# Patient Record
Sex: FEMALE | Race: WHITE | NOT HISPANIC OR LATINO | URBAN - METROPOLITAN AREA
[De-identification: names, ages, dates, MRNs, and addresses within clinical notes are randomized per-mention and may not be internally consistent; named-entity substitution may affect disease eponyms.]

---

## 2017-01-30 ENCOUNTER — OFFICE VISIT - HEALTHEAST (OUTPATIENT)
Dept: INFECTIOUS DISEASES | Facility: CLINIC | Age: 62
End: 2017-01-30

## 2017-01-30 DIAGNOSIS — M00.9 SEPTIC ARTHRITIS (H): ICD-10-CM

## 2017-01-30 DIAGNOSIS — T84.50XD PROSTHETIC JOINT INFECTION, SUBSEQUENT ENCOUNTER: ICD-10-CM

## 2017-01-30 DIAGNOSIS — B37.9 CANDIDA INFECTION: ICD-10-CM

## 2017-03-20 ENCOUNTER — RECORDS - HEALTHEAST (OUTPATIENT)
Dept: ADMINISTRATIVE | Facility: OTHER | Age: 62
End: 2017-03-20

## 2017-05-01 ENCOUNTER — OFFICE VISIT - HEALTHEAST (OUTPATIENT)
Dept: INFECTIOUS DISEASES | Facility: CLINIC | Age: 62
End: 2017-05-01

## 2017-05-01 DIAGNOSIS — B37.9 CANDIDA INFECTION: ICD-10-CM

## 2017-05-01 DIAGNOSIS — T84.50XD PROSTHETIC JOINT INFECTION, SUBSEQUENT ENCOUNTER: ICD-10-CM

## 2017-07-12 ASSESSMENT — MIFFLIN-ST. JEOR: SCORE: 1571.93

## 2017-07-16 ENCOUNTER — ANESTHESIA - HEALTHEAST (OUTPATIENT)
Dept: SURGERY | Facility: CLINIC | Age: 62
End: 2017-07-16

## 2017-07-16 ENCOUNTER — SURGERY - HEALTHEAST (OUTPATIENT)
Dept: SURGERY | Facility: CLINIC | Age: 62
End: 2017-07-16

## 2017-07-16 ASSESSMENT — MIFFLIN-ST. JEOR: SCORE: 1728.88

## 2017-07-18 ENCOUNTER — HOME CARE/HOSPICE - HEALTHEAST (OUTPATIENT)
Dept: HOME HEALTH SERVICES | Facility: HOME HEALTH | Age: 62
End: 2017-07-18

## 2017-07-19 ASSESSMENT — MIFFLIN-ST. JEOR: SCORE: 1714.82

## 2017-07-21 ENCOUNTER — AMBULATORY - HEALTHEAST (OUTPATIENT)
Dept: HOME HEALTH SERVICES | Facility: CLINIC | Age: 62
End: 2017-07-21

## 2017-07-21 ENCOUNTER — HOME CARE/HOSPICE - HEALTHEAST (OUTPATIENT)
Dept: HOME HEALTH SERVICES | Facility: HOME HEALTH | Age: 62
End: 2017-07-21

## 2017-07-21 DIAGNOSIS — Z96.659 INFECTED PROSTHETIC KNEE JOINT (H): ICD-10-CM

## 2017-07-21 DIAGNOSIS — T84.59XA INFECTED PROSTHETIC KNEE JOINT (H): ICD-10-CM

## 2017-07-21 ASSESSMENT — MIFFLIN-ST. JEOR: SCORE: 1737.5

## 2017-07-22 ENCOUNTER — HOME CARE/HOSPICE - HEALTHEAST (OUTPATIENT)
Dept: HOME HEALTH SERVICES | Facility: HOME HEALTH | Age: 62
End: 2017-07-22

## 2017-07-24 ENCOUNTER — COMMUNICATION - HEALTHEAST (OUTPATIENT)
Dept: HOME HEALTH SERVICES | Facility: CLINIC | Age: 62
End: 2017-07-24

## 2017-07-24 ENCOUNTER — HOME CARE/HOSPICE - HEALTHEAST (OUTPATIENT)
Dept: HOME HEALTH SERVICES | Facility: HOME HEALTH | Age: 62
End: 2017-07-24

## 2017-07-25 ENCOUNTER — COMMUNICATION - HEALTHEAST (OUTPATIENT)
Dept: INFECTIOUS DISEASES | Facility: CLINIC | Age: 62
End: 2017-07-25

## 2017-07-25 ENCOUNTER — RECORDS - HEALTHEAST (OUTPATIENT)
Dept: LAB | Facility: HOSPITAL | Age: 62
End: 2017-07-25

## 2017-07-25 ENCOUNTER — COMMUNICATION - HEALTHEAST (OUTPATIENT)
Dept: HOME HEALTH SERVICES | Facility: HOME HEALTH | Age: 62
End: 2017-07-25

## 2017-07-25 ENCOUNTER — HOME CARE/HOSPICE - HEALTHEAST (OUTPATIENT)
Dept: HOME HEALTH SERVICES | Facility: HOME HEALTH | Age: 62
End: 2017-07-25

## 2017-07-25 LAB
AST SERPL W P-5'-P-CCNC: 19 U/L (ref 0–40)
CREAT SERPL-MCNC: 0.69 MG/DL (ref 0.6–1.1)
GFR SERPL CREATININE-BSD FRML MDRD: >60 ML/MIN/1.73M2

## 2017-07-27 ENCOUNTER — COMMUNICATION - HEALTHEAST (OUTPATIENT)
Dept: HOME HEALTH SERVICES | Facility: HOME HEALTH | Age: 62
End: 2017-07-27

## 2017-07-27 ENCOUNTER — HOME CARE/HOSPICE - HEALTHEAST (OUTPATIENT)
Dept: HOME HEALTH SERVICES | Facility: HOME HEALTH | Age: 62
End: 2017-07-27

## 2017-07-31 ENCOUNTER — HOME CARE/HOSPICE - HEALTHEAST (OUTPATIENT)
Dept: HOME HEALTH SERVICES | Facility: HOME HEALTH | Age: 62
End: 2017-07-31

## 2017-08-02 ENCOUNTER — HOME CARE/HOSPICE - HEALTHEAST (OUTPATIENT)
Dept: HOME HEALTH SERVICES | Facility: HOME HEALTH | Age: 62
End: 2017-08-02

## 2017-08-02 ENCOUNTER — COMMUNICATION - HEALTHEAST (OUTPATIENT)
Dept: ADMINISTRATIVE | Facility: OTHER | Age: 62
End: 2017-08-02

## 2017-08-02 ENCOUNTER — RECORDS - HEALTHEAST (OUTPATIENT)
Dept: LAB | Facility: HOSPITAL | Age: 62
End: 2017-08-02

## 2017-08-02 LAB
AST SERPL W P-5'-P-CCNC: 13 U/L (ref 0–40)
CREAT SERPL-MCNC: 0.8 MG/DL (ref 0.6–1.1)
GFR SERPL CREATININE-BSD FRML MDRD: >60 ML/MIN/1.73M2

## 2017-08-03 ENCOUNTER — COMMUNICATION - HEALTHEAST (OUTPATIENT)
Dept: HOME HEALTH SERVICES | Facility: HOME HEALTH | Age: 62
End: 2017-08-03

## 2017-08-03 ASSESSMENT — MIFFLIN-ST. JEOR
SCORE: 1617.29
SCORE: 1754.38

## 2017-08-05 ASSESSMENT — MIFFLIN-ST. JEOR: SCORE: 1752.38

## 2017-08-06 ASSESSMENT — MIFFLIN-ST. JEOR: SCORE: 1757.38

## 2017-08-07 ENCOUNTER — HOME CARE/HOSPICE - HEALTHEAST (OUTPATIENT)
Dept: HOME HEALTH SERVICES | Facility: HOME HEALTH | Age: 62
End: 2017-08-07

## 2017-08-08 ENCOUNTER — ANESTHESIA - HEALTHEAST (OUTPATIENT)
Dept: SURGERY | Facility: CLINIC | Age: 62
End: 2017-08-08

## 2017-08-09 ENCOUNTER — SURGERY - HEALTHEAST (OUTPATIENT)
Dept: SURGERY | Facility: CLINIC | Age: 62
End: 2017-08-09

## 2017-08-14 ENCOUNTER — AMBULATORY - HEALTHEAST (OUTPATIENT)
Dept: HOME HEALTH SERVICES | Facility: CLINIC | Age: 62
End: 2017-08-14

## 2017-08-14 ENCOUNTER — HOME CARE/HOSPICE - HEALTHEAST (OUTPATIENT)
Dept: HOME HEALTH SERVICES | Facility: HOME HEALTH | Age: 62
End: 2017-08-14

## 2017-08-14 ENCOUNTER — COMMUNICATION - HEALTHEAST (OUTPATIENT)
Dept: HOME HEALTH SERVICES | Facility: CLINIC | Age: 62
End: 2017-08-14

## 2017-08-14 DIAGNOSIS — Z96.649 INFECTION OF PROSTHETIC HIP JOINT (H): ICD-10-CM

## 2017-08-14 DIAGNOSIS — T84.59XA INFECTION OF PROSTHETIC HIP JOINT (H): ICD-10-CM

## 2017-08-21 ENCOUNTER — COMMUNICATION - HEALTHEAST (OUTPATIENT)
Dept: HOME HEALTH SERVICES | Facility: HOME HEALTH | Age: 62
End: 2017-08-21

## 2017-08-22 ENCOUNTER — HOME CARE/HOSPICE - HEALTHEAST (OUTPATIENT)
Dept: HOME HEALTH SERVICES | Facility: HOME HEALTH | Age: 62
End: 2017-08-22

## 2017-08-23 ENCOUNTER — COMMUNICATION - HEALTHEAST (OUTPATIENT)
Dept: SURGERY | Facility: CLINIC | Age: 62
End: 2017-08-23

## 2017-08-23 ENCOUNTER — HOME CARE/HOSPICE - HEALTHEAST (OUTPATIENT)
Dept: HOME HEALTH SERVICES | Facility: HOME HEALTH | Age: 62
End: 2017-08-23

## 2017-08-25 ENCOUNTER — COMMUNICATION - HEALTHEAST (OUTPATIENT)
Dept: HOME HEALTH SERVICES | Facility: HOME HEALTH | Age: 62
End: 2017-08-25

## 2017-08-28 ENCOUNTER — HOME CARE/HOSPICE - HEALTHEAST (OUTPATIENT)
Dept: HOME HEALTH SERVICES | Facility: HOME HEALTH | Age: 62
End: 2017-08-28

## 2017-08-28 ENCOUNTER — AMBULATORY - HEALTHEAST (OUTPATIENT)
Dept: HOME HEALTH SERVICES | Facility: HOME HEALTH | Age: 62
End: 2017-08-28

## 2017-11-06 ENCOUNTER — AMBULATORY - HEALTHEAST (OUTPATIENT)
Dept: LAB | Facility: CLINIC | Age: 62
End: 2017-11-06

## 2017-11-06 ENCOUNTER — COMMUNICATION - HEALTHEAST (OUTPATIENT)
Dept: LAB | Facility: CLINIC | Age: 62
End: 2017-11-06

## 2017-11-06 ENCOUNTER — OFFICE VISIT - HEALTHEAST (OUTPATIENT)
Dept: INFECTIOUS DISEASES | Facility: CLINIC | Age: 62
End: 2017-11-06

## 2017-11-06 DIAGNOSIS — T81.49XA WOUND, SURGICAL, INFECTED: ICD-10-CM

## 2017-11-07 ENCOUNTER — COMMUNICATION - HEALTHEAST (OUTPATIENT)
Dept: INFECTIOUS DISEASES | Facility: CLINIC | Age: 62
End: 2017-11-07

## 2017-11-07 ENCOUNTER — AMBULATORY - HEALTHEAST (OUTPATIENT)
Dept: INFECTIOUS DISEASES | Facility: CLINIC | Age: 62
End: 2017-11-07

## 2017-11-08 ENCOUNTER — COMMUNICATION - HEALTHEAST (OUTPATIENT)
Dept: INFECTIOUS DISEASES | Facility: CLINIC | Age: 62
End: 2017-11-08

## 2017-11-09 ENCOUNTER — AMBULATORY - HEALTHEAST (OUTPATIENT)
Dept: INFECTIOUS DISEASES | Facility: CLINIC | Age: 62
End: 2017-11-09

## 2017-11-09 ENCOUNTER — COMMUNICATION - HEALTHEAST (OUTPATIENT)
Dept: INFECTIOUS DISEASES | Facility: CLINIC | Age: 62
End: 2017-11-09

## 2017-11-13 ENCOUNTER — AMBULATORY - HEALTHEAST (OUTPATIENT)
Dept: INFECTIOUS DISEASES | Facility: CLINIC | Age: 62
End: 2017-11-13

## 2017-11-13 ENCOUNTER — COMMUNICATION - HEALTHEAST (OUTPATIENT)
Dept: INFECTIOUS DISEASES | Facility: CLINIC | Age: 62
End: 2017-11-13

## 2017-11-21 ENCOUNTER — HOME CARE/HOSPICE - HEALTHEAST (OUTPATIENT)
Dept: HOME HEALTH SERVICES | Facility: HOME HEALTH | Age: 62
End: 2017-11-21

## 2017-11-25 ENCOUNTER — HOME INFUSION (PRE-WILLOW HOME INFUSION) (OUTPATIENT)
Dept: PHARMACY | Facility: CLINIC | Age: 62
End: 2017-11-25

## 2017-11-27 ENCOUNTER — RECORDS - HEALTHEAST (OUTPATIENT)
Dept: ADMINISTRATIVE | Facility: OTHER | Age: 62
End: 2017-11-27

## 2017-11-27 NOTE — PROGRESS NOTES
This is a recent snapshot of the patient's [facility] medical record.  For current drug dose and complete information and questions, call [phone] or In Basket pool, fv home infusion ([id number])  CSN Number:  [id number]

## 2017-11-28 ENCOUNTER — HOME INFUSION (PRE-WILLOW HOME INFUSION) (OUTPATIENT)
Dept: PHARMACY | Facility: CLINIC | Age: 62
End: 2017-11-28

## 2017-11-28 ENCOUNTER — RECORDS - HEALTHEAST (OUTPATIENT)
Dept: ADMINISTRATIVE | Facility: OTHER | Age: 62
End: 2017-11-28

## 2017-11-28 LAB
ALP SERPL-CCNC: 317 U/L (ref 40–150)
ALT SERPL W P-5'-P-CCNC: 183 U/L (ref 0–50)
AST SERPL W P-5'-P-CCNC: 225 U/L (ref 0–45)
BASOPHILS # BLD AUTO: 0 10E9/L (ref 0–0.2)
BASOPHILS NFR BLD AUTO: 0.3 %
BILIRUB SERPL-MCNC: 0.5 MG/DL (ref 0.2–1.3)
CREAT SERPL-MCNC: 0.52 MG/DL (ref 0.52–1.04)
CRP SERPL-MCNC: 40.1 MG/L (ref 0–8)
DIFFERENTIAL METHOD BLD: ABNORMAL
EOSINOPHIL # BLD AUTO: 0.2 10E9/L (ref 0–0.7)
EOSINOPHIL NFR BLD AUTO: 2.7 %
ERYTHROCYTE [DISTWIDTH] IN BLOOD BY AUTOMATED COUNT: 23.4 % (ref 10–15)
ERYTHROCYTE [SEDIMENTATION RATE] IN BLOOD BY WESTERGREN METHOD: 98 MM/H (ref 0–30)
GFR SERPL CREATININE-BSD FRML MDRD: >90 ML/MIN/1.7M2
HCT VFR BLD AUTO: 31.5 % (ref 35–47)
HGB BLD-MCNC: 8.8 G/DL (ref 11.7–15.7)
IMM GRANULOCYTES # BLD: 0 10E9/L (ref 0–0.4)
IMM GRANULOCYTES NFR BLD: 0.5 %
LYMPHOCYTES # BLD AUTO: 1.7 10E9/L (ref 0.8–5.3)
LYMPHOCYTES NFR BLD AUTO: 23 %
MCH RBC QN AUTO: 22.6 PG (ref 26.5–33)
MCHC RBC AUTO-ENTMCNC: 27.9 G/DL (ref 31.5–36.5)
MCV RBC AUTO: 81 FL (ref 78–100)
MONOCYTES # BLD AUTO: 0.7 10E9/L (ref 0–1.3)
MONOCYTES NFR BLD AUTO: 9.8 %
NEUTROPHILS # BLD AUTO: 4.7 10E9/L (ref 1.6–8.3)
NEUTROPHILS NFR BLD AUTO: 63.7 %
NRBC # BLD AUTO: 0 10*3/UL
NRBC BLD AUTO-RTO: 0 /100
PLATELET # BLD AUTO: 571 10E9/L (ref 150–450)
RBC # BLD AUTO: 3.9 10E12/L (ref 3.8–5.2)
WBC # BLD AUTO: 7.4 10E9/L (ref 4–11)

## 2017-11-28 PROCEDURE — 82565 ASSAY OF CREATININE: CPT | Performed by: INTERNAL MEDICINE

## 2017-11-28 PROCEDURE — 85025 COMPLETE CBC W/AUTO DIFF WBC: CPT | Performed by: INTERNAL MEDICINE

## 2017-11-28 PROCEDURE — 84075 ASSAY ALKALINE PHOSPHATASE: CPT | Performed by: INTERNAL MEDICINE

## 2017-11-28 PROCEDURE — 84460 ALANINE AMINO (ALT) (SGPT): CPT | Performed by: INTERNAL MEDICINE

## 2017-11-28 PROCEDURE — 86140 C-REACTIVE PROTEIN: CPT | Performed by: INTERNAL MEDICINE

## 2017-11-28 PROCEDURE — 82247 BILIRUBIN TOTAL: CPT | Performed by: INTERNAL MEDICINE

## 2017-11-28 PROCEDURE — 85652 RBC SED RATE AUTOMATED: CPT | Performed by: INTERNAL MEDICINE

## 2017-11-28 PROCEDURE — 84450 TRANSFERASE (AST) (SGOT): CPT | Performed by: INTERNAL MEDICINE

## 2017-11-29 ENCOUNTER — HOME INFUSION (PRE-WILLOW HOME INFUSION) (OUTPATIENT)
Dept: PHARMACY | Facility: CLINIC | Age: 62
End: 2017-11-29

## 2017-11-29 NOTE — PROGRESS NOTES
This is a recent snapshot of the patient's [facility] medical record.  For current drug dose and complete information and questions, call [phone] or In Basket pool, fv home infusion ([id number])  CSN Number:  [id number]

## 2017-11-30 NOTE — PROGRESS NOTES
This is a recent snapshot of the patient's [facility] medical record.  For current drug dose and complete information and questions, call [phone] or In Basket pool, fv home infusion ([id number])  CSN Number:  [id number]

## 2017-12-01 ENCOUNTER — HOME INFUSION (PRE-WILLOW HOME INFUSION) (OUTPATIENT)
Dept: PHARMACY | Facility: CLINIC | Age: 62
End: 2017-12-01

## 2017-12-04 ENCOUNTER — RECORDS - HEALTHEAST (OUTPATIENT)
Dept: ADMINISTRATIVE | Facility: OTHER | Age: 62
End: 2017-12-04

## 2017-12-04 NOTE — PROGRESS NOTES
This is a recent snapshot of the patient's [facility] medical record.  For current drug dose and complete information and questions, call [phone] or In Basket pool, fv home infusion ([id number])  CSN Number:  [id number]

## 2017-12-05 ENCOUNTER — HOME INFUSION (PRE-WILLOW HOME INFUSION) (OUTPATIENT)
Dept: PHARMACY | Facility: CLINIC | Age: 62
End: 2017-12-05

## 2017-12-05 LAB
ALBUMIN SERPL-MCNC: 2.7 G/DL (ref 3.4–5)
ALP SERPL-CCNC: 189 U/L (ref 40–150)
ALT SERPL W P-5'-P-CCNC: 30 U/L (ref 0–50)
ANION GAP SERPL CALCULATED.3IONS-SCNC: 6 MMOL/L (ref 3–14)
ANISOCYTOSIS BLD QL SMEAR: ABNORMAL
AST SERPL W P-5'-P-CCNC: 15 U/L (ref 0–45)
BASOPHILS # BLD AUTO: 0 10E9/L (ref 0–0.2)
BASOPHILS NFR BLD AUTO: 0.4 %
BILIRUB SERPL-MCNC: 0.3 MG/DL (ref 0.2–1.3)
BUN SERPL-MCNC: 7 MG/DL (ref 7–30)
CALCIUM SERPL-MCNC: 8.9 MG/DL (ref 8.5–10.1)
CHLORIDE SERPL-SCNC: 106 MMOL/L (ref 94–109)
CO2 SERPL-SCNC: 28 MMOL/L (ref 20–32)
CREAT SERPL-MCNC: 0.49 MG/DL (ref 0.52–1.04)
CRP SERPL-MCNC: 41.1 MG/L (ref 0–8)
DIFFERENTIAL METHOD BLD: ABNORMAL
EOSINOPHIL # BLD AUTO: 0.2 10E9/L (ref 0–0.7)
EOSINOPHIL NFR BLD AUTO: 2.5 %
ERYTHROCYTE [DISTWIDTH] IN BLOOD BY AUTOMATED COUNT: 23.2 % (ref 10–15)
ERYTHROCYTE [SEDIMENTATION RATE] IN BLOOD BY WESTERGREN METHOD: 98 MM/H (ref 0–30)
GFR SERPL CREATININE-BSD FRML MDRD: >90 ML/MIN/1.7M2
GLUCOSE SERPL-MCNC: 113 MG/DL (ref 70–99)
HCT VFR BLD AUTO: 29 % (ref 35–47)
HGB BLD-MCNC: 8.1 G/DL (ref 11.7–15.7)
HYPOCHROMIA BLD QL: PRESENT
IMM GRANULOCYTES # BLD: 0.1 10E9/L (ref 0–0.4)
IMM GRANULOCYTES NFR BLD: 0.7 %
LYMPHOCYTES # BLD AUTO: 1.8 10E9/L (ref 0.8–5.3)
LYMPHOCYTES NFR BLD AUTO: 25.5 %
MACROCYTES BLD QL SMEAR: PRESENT
MCH RBC QN AUTO: 22.6 PG (ref 26.5–33)
MCHC RBC AUTO-ENTMCNC: 27.9 G/DL (ref 31.5–36.5)
MCV RBC AUTO: 81 FL (ref 78–100)
MICROCYTES BLD QL SMEAR: PRESENT
MONOCYTES # BLD AUTO: 0.9 10E9/L (ref 0–1.3)
MONOCYTES NFR BLD AUTO: 12.7 %
NEUTROPHILS # BLD AUTO: 4.1 10E9/L (ref 1.6–8.3)
NEUTROPHILS NFR BLD AUTO: 58.2 %
NRBC # BLD AUTO: 0 10*3/UL
NRBC BLD AUTO-RTO: 0 /100
PLATELET # BLD AUTO: 552 10E9/L (ref 150–450)
PLATELET # BLD EST: ABNORMAL 10*3/UL
POTASSIUM SERPL-SCNC: 4 MMOL/L (ref 3.4–5.3)
PROT SERPL-MCNC: 8.3 G/DL (ref 6.8–8.8)
RBC # BLD AUTO: 3.59 10E12/L (ref 3.8–5.2)
SODIUM SERPL-SCNC: 140 MMOL/L (ref 133–144)
STOMATOCYTES BLD QL SMEAR: SLIGHT
WBC # BLD AUTO: 7.1 10E9/L (ref 4–11)

## 2017-12-05 PROCEDURE — 85025 COMPLETE CBC W/AUTO DIFF WBC: CPT | Performed by: INTERNAL MEDICINE

## 2017-12-05 PROCEDURE — 85652 RBC SED RATE AUTOMATED: CPT | Performed by: INTERNAL MEDICINE

## 2017-12-05 PROCEDURE — 86140 C-REACTIVE PROTEIN: CPT | Performed by: INTERNAL MEDICINE

## 2017-12-05 PROCEDURE — 80053 COMPREHEN METABOLIC PANEL: CPT | Performed by: INTERNAL MEDICINE

## 2017-12-06 ENCOUNTER — HOME INFUSION (PRE-WILLOW HOME INFUSION) (OUTPATIENT)
Dept: PHARMACY | Facility: CLINIC | Age: 62
End: 2017-12-06

## 2017-12-06 NOTE — PROGRESS NOTES
This is a recent snapshot of the patient's [facility] medical record.  For current drug dose and complete information and questions, call [phone] or In Basket pool, fv home infusion ([id number])  CSN Number:  [id number]

## 2017-12-11 ENCOUNTER — RECORDS - HEALTHEAST (OUTPATIENT)
Dept: ADMINISTRATIVE | Facility: OTHER | Age: 62
End: 2017-12-11

## 2017-12-12 ENCOUNTER — HOSPITAL ENCOUNTER (OUTPATIENT)
Dept: INTERVENTIONAL RADIOLOGY/VASCULAR | Facility: CLINIC | Age: 62
Discharge: HOME | End: 2017-12-12
Attending: INTERNAL MEDICINE

## 2017-12-12 ENCOUNTER — HOME INFUSION (PRE-WILLOW HOME INFUSION) (OUTPATIENT)
Dept: PHARMACY | Facility: CLINIC | Age: 62
End: 2017-12-12

## 2017-12-12 LAB
ALP SERPL-CCNC: 208 U/L (ref 40–150)
ALT SERPL W P-5'-P-CCNC: 17 U/L (ref 0–50)
AST SERPL W P-5'-P-CCNC: 16 U/L (ref 0–45)
BASOPHILS # BLD AUTO: 0 10E9/L (ref 0–0.2)
BASOPHILS NFR BLD AUTO: 0.2 %
BILIRUB SERPL-MCNC: 0.3 MG/DL (ref 0.2–1.3)
CREAT SERPL-MCNC: 0.52 MG/DL (ref 0.52–1.04)
CRP SERPL-MCNC: 69.2 MG/L (ref 0–8)
DIFFERENTIAL METHOD BLD: ABNORMAL
EOSINOPHIL # BLD AUTO: 0.2 10E9/L (ref 0–0.7)
EOSINOPHIL NFR BLD AUTO: 2.5 %
ERYTHROCYTE [DISTWIDTH] IN BLOOD BY AUTOMATED COUNT: 21.2 % (ref 10–15)
ERYTHROCYTE [SEDIMENTATION RATE] IN BLOOD BY WESTERGREN METHOD: 104 MM/H (ref 0–30)
GFR SERPL CREATININE-BSD FRML MDRD: >90 ML/MIN/1.7M2
HCT VFR BLD AUTO: 31.8 % (ref 35–47)
HGB BLD-MCNC: 8.7 G/DL (ref 11.7–15.7)
IMM GRANULOCYTES # BLD: 0 10E9/L (ref 0–0.4)
IMM GRANULOCYTES NFR BLD: 0.3 %
LYMPHOCYTES # BLD AUTO: 1.7 10E9/L (ref 0.8–5.3)
LYMPHOCYTES NFR BLD AUTO: 17.6 %
MCH RBC QN AUTO: 22 PG (ref 26.5–33)
MCHC RBC AUTO-ENTMCNC: 27.4 G/DL (ref 31.5–36.5)
MCV RBC AUTO: 81 FL (ref 78–100)
MONOCYTES # BLD AUTO: 0.9 10E9/L (ref 0–1.3)
MONOCYTES NFR BLD AUTO: 9.3 %
NEUTROPHILS # BLD AUTO: 6.6 10E9/L (ref 1.6–8.3)
NEUTROPHILS NFR BLD AUTO: 70.1 %
NRBC # BLD AUTO: 0 10*3/UL
NRBC BLD AUTO-RTO: 0 /100
PLATELET # BLD AUTO: 536 10E9/L (ref 150–450)
RBC # BLD AUTO: 3.95 10E12/L (ref 3.8–5.2)
WBC # BLD AUTO: 9.4 10E9/L (ref 4–11)

## 2017-12-12 PROCEDURE — 84450 TRANSFERASE (AST) (SGOT): CPT | Performed by: INTERNAL MEDICINE

## 2017-12-12 PROCEDURE — 84075 ASSAY ALKALINE PHOSPHATASE: CPT | Performed by: INTERNAL MEDICINE

## 2017-12-12 PROCEDURE — 85025 COMPLETE CBC W/AUTO DIFF WBC: CPT | Performed by: INTERNAL MEDICINE

## 2017-12-12 PROCEDURE — 82247 BILIRUBIN TOTAL: CPT | Performed by: INTERNAL MEDICINE

## 2017-12-12 PROCEDURE — 85652 RBC SED RATE AUTOMATED: CPT | Performed by: INTERNAL MEDICINE

## 2017-12-12 PROCEDURE — 86140 C-REACTIVE PROTEIN: CPT | Performed by: INTERNAL MEDICINE

## 2017-12-12 PROCEDURE — 84460 ALANINE AMINO (ALT) (SGPT): CPT | Performed by: INTERNAL MEDICINE

## 2017-12-12 PROCEDURE — 82565 ASSAY OF CREATININE: CPT | Performed by: INTERNAL MEDICINE

## 2017-12-13 NOTE — PROGRESS NOTES
This is a recent snapshot of the patient's [facility] medical record.  For current drug dose and complete information and questions, call [phone] or In Basket pool, fv home infusion ([id number])  CSN Number:  [id number]

## 2017-12-15 ENCOUNTER — HOME INFUSION (PRE-WILLOW HOME INFUSION) (OUTPATIENT)
Dept: PHARMACY | Facility: CLINIC | Age: 62
End: 2017-12-15

## 2017-12-18 NOTE — PROGRESS NOTES
This is a recent snapshot of the patient's [facility] medical record.  For current drug dose and complete information and questions, call [phone] or In Basket pool, fv home infusion ([id number])  CSN Number:  [id number]

## 2017-12-19 ENCOUNTER — RECORDS - HEALTHEAST (OUTPATIENT)
Dept: ADMINISTRATIVE | Facility: OTHER | Age: 62
End: 2017-12-19

## 2017-12-19 ENCOUNTER — DOCUMENTATION ONLY (OUTPATIENT)
Dept: CARE COORDINATION | Facility: CLINIC | Age: 62
End: 2017-12-19

## 2017-12-19 ENCOUNTER — HOME INFUSION (PRE-WILLOW HOME INFUSION) (OUTPATIENT)
Dept: PHARMACY | Facility: CLINIC | Age: 62
End: 2017-12-19

## 2017-12-19 LAB
ALP SERPL-CCNC: 226 U/L (ref 40–150)
ALT SERPL W P-5'-P-CCNC: 32 U/L (ref 0–50)
AST SERPL W P-5'-P-CCNC: 34 U/L (ref 0–45)
BASOPHILS # BLD AUTO: 0 10E9/L (ref 0–0.2)
BASOPHILS NFR BLD AUTO: 0.3 %
BILIRUB SERPL-MCNC: 0.2 MG/DL (ref 0.2–1.3)
CREAT SERPL-MCNC: 0.6 MG/DL (ref 0.52–1.04)
CRP SERPL-MCNC: 21.3 MG/L (ref 0–8)
DIFFERENTIAL METHOD BLD: ABNORMAL
EOSINOPHIL # BLD AUTO: 0.2 10E9/L (ref 0–0.7)
EOSINOPHIL NFR BLD AUTO: 3 %
ERYTHROCYTE [DISTWIDTH] IN BLOOD BY AUTOMATED COUNT: 20.8 % (ref 10–15)
ERYTHROCYTE [SEDIMENTATION RATE] IN BLOOD BY WESTERGREN METHOD: 85 MM/H (ref 0–30)
GFR SERPL CREATININE-BSD FRML MDRD: >90 ML/MIN/1.7M2
HCT VFR BLD AUTO: 32.5 % (ref 35–47)
HGB BLD-MCNC: 9 G/DL (ref 11.7–15.7)
IMM GRANULOCYTES # BLD: 0 10E9/L (ref 0–0.4)
IMM GRANULOCYTES NFR BLD: 0.5 %
LYMPHOCYTES # BLD AUTO: 1.9 10E9/L (ref 0.8–5.3)
LYMPHOCYTES NFR BLD AUTO: 25.1 %
MCH RBC QN AUTO: 22.2 PG (ref 26.5–33)
MCHC RBC AUTO-ENTMCNC: 27.7 G/DL (ref 31.5–36.5)
MCV RBC AUTO: 80 FL (ref 78–100)
MONOCYTES # BLD AUTO: 0.7 10E9/L (ref 0–1.3)
MONOCYTES NFR BLD AUTO: 8.7 %
NEUTROPHILS # BLD AUTO: 4.7 10E9/L (ref 1.6–8.3)
NEUTROPHILS NFR BLD AUTO: 62.4 %
NRBC # BLD AUTO: 0 10*3/UL
NRBC BLD AUTO-RTO: 0 /100
PLATELET # BLD AUTO: 551 10E9/L (ref 150–450)
RBC # BLD AUTO: 4.05 10E12/L (ref 3.8–5.2)
WBC # BLD AUTO: 7.6 10E9/L (ref 4–11)

## 2017-12-19 PROCEDURE — 84460 ALANINE AMINO (ALT) (SGPT): CPT | Performed by: INTERNAL MEDICINE

## 2017-12-19 PROCEDURE — 86140 C-REACTIVE PROTEIN: CPT | Performed by: INTERNAL MEDICINE

## 2017-12-19 PROCEDURE — 85652 RBC SED RATE AUTOMATED: CPT | Performed by: INTERNAL MEDICINE

## 2017-12-19 PROCEDURE — 85025 COMPLETE CBC W/AUTO DIFF WBC: CPT | Performed by: INTERNAL MEDICINE

## 2017-12-19 PROCEDURE — 84450 TRANSFERASE (AST) (SGOT): CPT | Performed by: INTERNAL MEDICINE

## 2017-12-19 PROCEDURE — 82565 ASSAY OF CREATININE: CPT | Performed by: INTERNAL MEDICINE

## 2017-12-19 PROCEDURE — 82247 BILIRUBIN TOTAL: CPT | Performed by: INTERNAL MEDICINE

## 2017-12-19 PROCEDURE — 84075 ASSAY ALKALINE PHOSPHATASE: CPT | Performed by: INTERNAL MEDICINE

## 2017-12-19 NOTE — PROGRESS NOTES
Dear Dr. Cecelia Muhammad  Medicare Home Health regulations requires [facility] to notify the Physician when the plan for visits has been altered.  We have provided fewer visits than ordered.  We are notifying you of a Missed Visit.  Nicole Graciela Tomi; MRN [id number]  Missed Visit  Is SW  Dates of missed services  12/19/2017  Reason: Patient declined need for SW assess  Sincerely [facility]  Sheridan Hernandez  [phone]

## 2017-12-20 ENCOUNTER — HOME INFUSION (PRE-WILLOW HOME INFUSION) (OUTPATIENT)
Dept: PHARMACY | Facility: CLINIC | Age: 62
End: 2017-12-20

## 2017-12-20 ENCOUNTER — COMMUNICATION - HEALTHEAST (OUTPATIENT)
Dept: INFECTIOUS DISEASES | Facility: CLINIC | Age: 62
End: 2017-12-20

## 2017-12-20 ENCOUNTER — HOSPITAL ENCOUNTER (OUTPATIENT)
Dept: CT IMAGING | Facility: HOSPITAL | Age: 62
Discharge: HOME | End: 2017-12-20
Attending: INTERNAL MEDICINE

## 2017-12-20 ENCOUNTER — HOSPITAL ENCOUNTER (OUTPATIENT)
Dept: INTERVENTIONAL RADIOLOGY/VASCULAR | Facility: HOSPITAL | Age: 62
Discharge: HOME | End: 2017-12-20
Attending: INTERNAL MEDICINE

## 2017-12-20 DIAGNOSIS — Z96.641 STATUS POST TOTAL REPLACEMENT OF RIGHT HIP: ICD-10-CM

## 2017-12-20 DIAGNOSIS — L02.91 ABSCESS: ICD-10-CM

## 2017-12-20 DIAGNOSIS — Z96.649 INFECTION OF PROSTHETIC HIP JOINT, SUBSEQUENT ENCOUNTER: ICD-10-CM

## 2017-12-20 DIAGNOSIS — T84.59XD INFECTION OF PROSTHETIC HIP JOINT, SUBSEQUENT ENCOUNTER: ICD-10-CM

## 2017-12-20 NOTE — PROGRESS NOTES
This is a recent snapshot of the patient's [facility] medical record.  For current drug dose and complete information and questions, call [phone] or In [facility] pool, fv home infusion ([id number])  CSN Number:  [id number]

## 2017-12-26 ENCOUNTER — RECORDS - HEALTHEAST (OUTPATIENT)
Dept: ADMINISTRATIVE | Facility: OTHER | Age: 62
End: 2017-12-26

## 2017-12-26 ENCOUNTER — HOME INFUSION (PRE-WILLOW HOME INFUSION) (OUTPATIENT)
Dept: PHARMACY | Facility: CLINIC | Age: 62
End: 2017-12-26

## 2017-12-26 LAB
ALP SERPL-CCNC: 212 U/L (ref 40–150)
ALT SERPL W P-5'-P-CCNC: 24 U/L (ref 0–50)
AST SERPL W P-5'-P-CCNC: 21 U/L (ref 0–45)
BASOPHILS # BLD AUTO: 0 10E9/L (ref 0–0.2)
BASOPHILS NFR BLD AUTO: 0.5 %
BILIRUB SERPL-MCNC: 0.2 MG/DL (ref 0.2–1.3)
CREAT SERPL-MCNC: 0.65 MG/DL (ref 0.52–1.04)
CRP SERPL-MCNC: 13.4 MG/L (ref 0–8)
DIFFERENTIAL METHOD BLD: ABNORMAL
EOSINOPHIL # BLD AUTO: 0.2 10E9/L (ref 0–0.7)
EOSINOPHIL NFR BLD AUTO: 3.8 %
ERYTHROCYTE [DISTWIDTH] IN BLOOD BY AUTOMATED COUNT: 21.3 % (ref 10–15)
ERYTHROCYTE [SEDIMENTATION RATE] IN BLOOD BY WESTERGREN METHOD: 70 MM/H (ref 0–30)
GFR SERPL CREATININE-BSD FRML MDRD: >90 ML/MIN/1.7M2
HCT VFR BLD AUTO: 33.9 % (ref 35–47)
HGB BLD-MCNC: 9.4 G/DL (ref 11.7–15.7)
IMM GRANULOCYTES # BLD: 0 10E9/L (ref 0–0.4)
IMM GRANULOCYTES NFR BLD: 0.5 %
LYMPHOCYTES # BLD AUTO: 1.6 10E9/L (ref 0.8–5.3)
LYMPHOCYTES NFR BLD AUTO: 28.4 %
MCH RBC QN AUTO: 22.5 PG (ref 26.5–33)
MCHC RBC AUTO-ENTMCNC: 27.7 G/DL (ref 31.5–36.5)
MCV RBC AUTO: 81 FL (ref 78–100)
MONOCYTES # BLD AUTO: 0.5 10E9/L (ref 0–1.3)
MONOCYTES NFR BLD AUTO: 8.4 %
NEUTROPHILS # BLD AUTO: 3.4 10E9/L (ref 1.6–8.3)
NEUTROPHILS NFR BLD AUTO: 58.4 %
NRBC # BLD AUTO: 0 10*3/UL
NRBC BLD AUTO-RTO: 0 /100
PLATELET # BLD AUTO: 494 10E9/L (ref 150–450)
RBC # BLD AUTO: 4.17 10E12/L (ref 3.8–5.2)
WBC # BLD AUTO: 5.7 10E9/L (ref 4–11)

## 2017-12-26 PROCEDURE — 84450 TRANSFERASE (AST) (SGOT): CPT | Performed by: INTERNAL MEDICINE

## 2017-12-26 PROCEDURE — 86140 C-REACTIVE PROTEIN: CPT | Performed by: INTERNAL MEDICINE

## 2017-12-26 PROCEDURE — 82565 ASSAY OF CREATININE: CPT | Performed by: INTERNAL MEDICINE

## 2017-12-26 PROCEDURE — 82247 BILIRUBIN TOTAL: CPT | Performed by: INTERNAL MEDICINE

## 2017-12-26 PROCEDURE — 84075 ASSAY ALKALINE PHOSPHATASE: CPT | Performed by: INTERNAL MEDICINE

## 2017-12-26 PROCEDURE — 85652 RBC SED RATE AUTOMATED: CPT | Performed by: INTERNAL MEDICINE

## 2017-12-26 PROCEDURE — 84460 ALANINE AMINO (ALT) (SGPT): CPT | Performed by: INTERNAL MEDICINE

## 2017-12-26 PROCEDURE — 85025 COMPLETE CBC W/AUTO DIFF WBC: CPT | Performed by: INTERNAL MEDICINE

## 2017-12-27 ENCOUNTER — HOME INFUSION (PRE-WILLOW HOME INFUSION) (OUTPATIENT)
Dept: PHARMACY | Facility: CLINIC | Age: 62
End: 2017-12-27

## 2017-12-27 NOTE — PROGRESS NOTES
This is a recent snapshot of the patient's [facility] medical record.  For current drug dose and complete information and questions, call [phone] or In Basket pool, fv home infusion ([id number])  CSN Number:  [id number]

## 2017-12-28 NOTE — PROGRESS NOTES
This is a recent snapshot of the patient's [facility] medical record.  For current drug dose and complete information and questions, call [phone] or In Basket pool, fv home infusion ([id number])  CSN Number:  [id number]

## 2018-01-02 ENCOUNTER — HOME INFUSION (PRE-WILLOW HOME INFUSION) (OUTPATIENT)
Dept: PHARMACY | Facility: CLINIC | Age: 63
End: 2018-01-02

## 2018-01-02 LAB
ALP SERPL-CCNC: 197 U/L (ref 40–150)
ALT SERPL W P-5'-P-CCNC: 27 U/L (ref 0–50)
AST SERPL W P-5'-P-CCNC: 27 U/L (ref 0–45)
BASOPHILS # BLD AUTO: 0 10E9/L (ref 0–0.2)
BASOPHILS NFR BLD AUTO: 0.3 %
BILIRUB SERPL-MCNC: 0.4 MG/DL (ref 0.2–1.3)
CREAT SERPL-MCNC: 0.74 MG/DL (ref 0.52–1.04)
CRP SERPL-MCNC: 12.9 MG/L (ref 0–8)
DIFFERENTIAL METHOD BLD: ABNORMAL
EOSINOPHIL # BLD AUTO: 0.2 10E9/L (ref 0–0.7)
EOSINOPHIL NFR BLD AUTO: 3.1 %
ERYTHROCYTE [DISTWIDTH] IN BLOOD BY AUTOMATED COUNT: 21.9 % (ref 10–15)
ERYTHROCYTE [SEDIMENTATION RATE] IN BLOOD BY WESTERGREN METHOD: 67 MM/H (ref 0–30)
GFR SERPL CREATININE-BSD FRML MDRD: 79 ML/MIN/1.7M2
HCT VFR BLD AUTO: 35.8 % (ref 35–47)
HGB BLD-MCNC: 9.8 G/DL (ref 11.7–15.7)
IMM GRANULOCYTES # BLD: 0 10E9/L (ref 0–0.4)
IMM GRANULOCYTES NFR BLD: 0.3 %
LYMPHOCYTES # BLD AUTO: 2.1 10E9/L (ref 0.8–5.3)
LYMPHOCYTES NFR BLD AUTO: 33.5 %
MCH RBC QN AUTO: 22.8 PG (ref 26.5–33)
MCHC RBC AUTO-ENTMCNC: 27.4 G/DL (ref 31.5–36.5)
MCV RBC AUTO: 83 FL (ref 78–100)
MONOCYTES # BLD AUTO: 0.7 10E9/L (ref 0–1.3)
MONOCYTES NFR BLD AUTO: 10.6 %
NEUTROPHILS # BLD AUTO: 3.2 10E9/L (ref 1.6–8.3)
NEUTROPHILS NFR BLD AUTO: 52.2 %
NRBC # BLD AUTO: 0 10*3/UL
NRBC BLD AUTO-RTO: 0 /100
PLATELET # BLD AUTO: 419 10E9/L (ref 150–450)
RBC # BLD AUTO: 4.3 10E12/L (ref 3.8–5.2)
WBC # BLD AUTO: 6.1 10E9/L (ref 4–11)

## 2018-01-02 PROCEDURE — 82565 ASSAY OF CREATININE: CPT | Performed by: INTERNAL MEDICINE

## 2018-01-02 PROCEDURE — 85025 COMPLETE CBC W/AUTO DIFF WBC: CPT | Performed by: INTERNAL MEDICINE

## 2018-01-02 PROCEDURE — 86140 C-REACTIVE PROTEIN: CPT | Performed by: INTERNAL MEDICINE

## 2018-01-02 PROCEDURE — 84460 ALANINE AMINO (ALT) (SGPT): CPT | Performed by: INTERNAL MEDICINE

## 2018-01-02 PROCEDURE — 84075 ASSAY ALKALINE PHOSPHATASE: CPT | Performed by: INTERNAL MEDICINE

## 2018-01-02 PROCEDURE — 84450 TRANSFERASE (AST) (SGOT): CPT | Performed by: INTERNAL MEDICINE

## 2018-01-02 PROCEDURE — 85652 RBC SED RATE AUTOMATED: CPT | Performed by: INTERNAL MEDICINE

## 2018-01-02 PROCEDURE — 82247 BILIRUBIN TOTAL: CPT | Performed by: INTERNAL MEDICINE

## 2018-01-03 ENCOUNTER — HOSPITAL ENCOUNTER (OUTPATIENT)
Dept: INTERVENTIONAL RADIOLOGY/VASCULAR | Facility: HOSPITAL | Age: 63
Discharge: HOME | End: 2018-01-03
Attending: RADIOLOGY

## 2018-01-03 ENCOUNTER — HOSPITAL ENCOUNTER (OUTPATIENT)
Dept: CT IMAGING | Facility: HOSPITAL | Age: 63
Discharge: HOME | End: 2018-01-03

## 2018-01-03 ENCOUNTER — HOME INFUSION (PRE-WILLOW HOME INFUSION) (OUTPATIENT)
Dept: PHARMACY | Facility: CLINIC | Age: 63
End: 2018-01-03

## 2018-01-03 ENCOUNTER — RECORDS - HEALTHEAST (OUTPATIENT)
Dept: ADMINISTRATIVE | Facility: OTHER | Age: 63
End: 2018-01-03

## 2018-01-03 DIAGNOSIS — T81.40XA INFECTION FOLLOWING PROCEDURE: ICD-10-CM

## 2018-01-03 DIAGNOSIS — L02.91 ABSCESS: ICD-10-CM

## 2018-01-03 DIAGNOSIS — T81.49XA ABSCESS AFTER PROCEDURE: ICD-10-CM

## 2018-01-03 LAB
CREAT BLD-MCNC: 0.6 MG/DL
POC GFR AMER AF HE - HISTORICAL: >60 ML/MIN/1.73M2
POC GFR NON AMER AF HE - HISTORICAL: >60 ML/MIN/1.73M2

## 2018-01-03 NOTE — PROGRESS NOTES
This is a recent snapshot of the patient's [facility] medical record.  For current drug dose and complete information and questions, call [phone] or In Basket pool, fv home infusion ([id number])  CSN Number:  [id number]

## 2018-01-04 NOTE — PROGRESS NOTES
This is a recent snapshot of the patient's [facility] medical record.  For current drug dose and complete information and questions, call [phone] or In [facility] pool, fv home infusion ([id number])  CSN Number:  [id number]

## 2018-01-08 ENCOUNTER — OFFICE VISIT - HEALTHEAST (OUTPATIENT)
Dept: INFECTIOUS DISEASES | Facility: CLINIC | Age: 63
End: 2018-01-08

## 2018-01-08 DIAGNOSIS — M00.9 SEPTIC ARTHRITIS (H): ICD-10-CM

## 2018-01-09 ENCOUNTER — RECORDS - HEALTHEAST (OUTPATIENT)
Dept: LAB | Facility: CLINIC | Age: 63
End: 2018-01-09

## 2018-01-09 ENCOUNTER — HOME INFUSION (PRE-WILLOW HOME INFUSION) (OUTPATIENT)
Dept: PHARMACY | Facility: CLINIC | Age: 63
End: 2018-01-09

## 2018-01-09 LAB
ALP SERPL-CCNC: 181 U/L (ref 45–120)
ALT SERPL W P-5'-P-CCNC: 22 U/L (ref 0–45)
AST SERPL W P-5'-P-CCNC: 23 U/L (ref 0–40)
BASOPHILS # BLD AUTO: 0 THOU/UL (ref 0–0.2)
BASOPHILS NFR BLD AUTO: 0 % (ref 0–2)
BILIRUB SERPL-MCNC: 0.2 MG/DL (ref 0–1)
C REACTIVE PROTEIN LHE: 3.6 MG/DL (ref 0–0.8)
CREAT SERPL-MCNC: 0.66 MG/DL (ref 0.6–1.1)
EOSINOPHIL # BLD AUTO: 0.2 THOU/UL (ref 0–0.4)
EOSINOPHIL NFR BLD AUTO: 2 % (ref 0–6)
ERYTHROCYTE [DISTWIDTH] IN BLOOD BY AUTOMATED COUNT: 22.5 % (ref 11–14.5)
ERYTHROCYTE [SEDIMENTATION RATE] IN BLOOD BY WESTERGREN METHOD: 76 MM/HR (ref 0–20)
GFR SERPL CREATININE-BSD FRML MDRD: >60 ML/MIN/1.73M2
HCT VFR BLD AUTO: 36.5 % (ref 35–47)
HGB BLD-MCNC: 10.3 G/DL (ref 12–16)
LYMPHOCYTES # BLD AUTO: 1.7 THOU/UL (ref 0.8–4.4)
LYMPHOCYTES NFR BLD AUTO: 21 % (ref 20–40)
MCH RBC QN AUTO: 23.7 PG (ref 27–34)
MCHC RBC AUTO-ENTMCNC: 28.2 G/DL (ref 32–36)
MCV RBC AUTO: 84 FL (ref 80–100)
MONOCYTES # BLD AUTO: 0.7 THOU/UL (ref 0–0.9)
MONOCYTES NFR BLD AUTO: 9 % (ref 2–10)
NEUTROPHILS # BLD AUTO: 5.3 THOU/UL (ref 2–7.7)
NEUTROPHILS NFR BLD AUTO: 67 % (ref 50–70)
PLAT MORPH BLD: NORMAL
PLATELET # BLD AUTO: 382 THOU/UL (ref 140–440)
PMV BLD AUTO: 10.3 FL (ref 8.5–12.5)
POLYCHROMASIA BLD QL SMEAR: ABNORMAL
RBC # BLD AUTO: 4.34 MILL/UL (ref 3.8–5.4)
TARGETS BLD QL SMEAR: ABNORMAL
WBC: 7.9 THOU/UL (ref 4–11)

## 2018-01-10 NOTE — PROGRESS NOTES
This is a recent snapshot of the patient's [facility] medical record.  For current drug dose and complete information and questions, call [phone] or In Basket pool, fv home infusion ([phone])  CSN Number:  [id number]

## 2018-01-12 ENCOUNTER — RECORDS - HEALTHEAST (OUTPATIENT)
Dept: ADMINISTRATIVE | Facility: OTHER | Age: 63
End: 2018-01-12

## 2018-06-06 ASSESSMENT — MIFFLIN-ST. JEOR
SCORE: 2013.06
SCORE: 1617.29

## 2018-06-07 ASSESSMENT — MIFFLIN-ST. JEOR: SCORE: 1971.1

## 2018-06-08 ENCOUNTER — SURGERY - HEALTHEAST (OUTPATIENT)
Dept: CARDIOLOGY | Facility: CLINIC | Age: 63
End: 2018-06-08

## 2018-06-08 ASSESSMENT — MIFFLIN-ST. JEOR: SCORE: 1857.7

## 2018-06-09 ASSESSMENT — MIFFLIN-ST. JEOR: SCORE: 1849.99

## 2018-06-10 ASSESSMENT — MIFFLIN-ST. JEOR: SCORE: 1832.75

## 2018-06-11 ASSESSMENT — MIFFLIN-ST. JEOR: SCORE: 1748.84

## 2018-06-15 ENCOUNTER — AMBULATORY - HEALTHEAST (OUTPATIENT)
Dept: CARDIOLOGY | Facility: CLINIC | Age: 63
End: 2018-06-15

## 2018-06-15 ENCOUNTER — RECORDS - HEALTHEAST (OUTPATIENT)
Dept: ADMINISTRATIVE | Facility: OTHER | Age: 63
End: 2018-06-15

## 2018-06-25 ENCOUNTER — RECORDS - HEALTHEAST (OUTPATIENT)
Dept: LAB | Facility: CLINIC | Age: 63
End: 2018-06-25

## 2018-06-25 LAB
ALBUMIN SERPL-MCNC: 3.9 G/DL (ref 3.5–5)
ALP SERPL-CCNC: 171 U/L (ref 45–120)
ALT SERPL W P-5'-P-CCNC: 43 U/L (ref 0–45)
ANION GAP SERPL CALCULATED.3IONS-SCNC: 12 MMOL/L (ref 5–18)
AST SERPL W P-5'-P-CCNC: 44 U/L (ref 0–40)
BILIRUB SERPL-MCNC: 0.6 MG/DL (ref 0–1)
BUN SERPL-MCNC: 20 MG/DL (ref 8–22)
CALCIUM SERPL-MCNC: 10.3 MG/DL (ref 8.5–10.5)
CHLORIDE BLD-SCNC: 103 MMOL/L (ref 98–107)
CO2 SERPL-SCNC: 24 MMOL/L (ref 22–31)
CREAT SERPL-MCNC: 0.84 MG/DL (ref 0.6–1.1)
GFR SERPL CREATININE-BSD FRML MDRD: >60 ML/MIN/1.73M2
GLUCOSE BLD-MCNC: 124 MG/DL (ref 70–125)
IRON SERPL-MCNC: 45 UG/DL (ref 42–175)
POTASSIUM BLD-SCNC: 3.7 MMOL/L (ref 3.5–5)
PROT SERPL-MCNC: 8.8 G/DL (ref 6–8)
SODIUM SERPL-SCNC: 139 MMOL/L (ref 136–145)

## 2018-07-11 ENCOUNTER — AMBULATORY - HEALTHEAST (OUTPATIENT)
Dept: CARDIOLOGY | Facility: CLINIC | Age: 63
End: 2018-07-11

## 2018-08-20 ENCOUNTER — COMMUNICATION - HEALTHEAST (OUTPATIENT)
Dept: CARDIOLOGY | Facility: CLINIC | Age: 63
End: 2018-08-20

## 2018-09-11 ENCOUNTER — COMMUNICATION - HEALTHEAST (OUTPATIENT)
Dept: CARDIOLOGY | Facility: CLINIC | Age: 63
End: 2018-09-11

## 2019-01-18 ENCOUNTER — COMMUNICATION - HEALTHEAST (OUTPATIENT)
Dept: INFECTIOUS DISEASES | Facility: CLINIC | Age: 64
End: 2019-01-18

## 2019-01-18 DIAGNOSIS — M00.9 SEPTIC ARTHRITIS (H): ICD-10-CM

## 2019-02-13 NOTE — PROGRESS NOTES
This is a recent snapshot of the patient's [facility] medical record.  For current drug dose and complete information and questions, call [phone] or In Basket pool, fv home infusion ([id number])  CSN Number:  [id number]      
No significant past surgical history

## 2019-04-06 ENCOUNTER — HOME CARE/HOSPICE - HEALTHEAST (OUTPATIENT)
Dept: HOME HEALTH SERVICES | Facility: HOME HEALTH | Age: 64
End: 2019-04-06

## 2020-02-27 ENCOUNTER — AMBULATORY - HEALTHEAST (OUTPATIENT)
Dept: PULMONOLOGY | Facility: OTHER | Age: 65
End: 2020-02-27

## 2020-02-27 DIAGNOSIS — R06.02 SOB (SHORTNESS OF BREATH): ICD-10-CM

## 2020-04-20 ENCOUNTER — OFFICE VISIT - HEALTHEAST (OUTPATIENT)
Dept: PULMONOLOGY | Facility: OTHER | Age: 65
End: 2020-04-20

## 2020-08-15 ENCOUNTER — COMMUNICATION - HEALTHEAST (OUTPATIENT)
Dept: SCHEDULING | Facility: CLINIC | Age: 65
End: 2020-08-15

## 2020-08-21 ENCOUNTER — HOME CARE/HOSPICE - HEALTHEAST (OUTPATIENT)
Dept: HOME HEALTH SERVICES | Facility: HOME HEALTH | Age: 65
End: 2020-08-21

## 2020-08-26 ENCOUNTER — HOME CARE/HOSPICE - HEALTHEAST (OUTPATIENT)
Dept: HOME HEALTH SERVICES | Facility: HOME HEALTH | Age: 65
End: 2020-08-26

## 2020-08-28 ENCOUNTER — HOME CARE/HOSPICE - HEALTHEAST (OUTPATIENT)
Dept: HOME HEALTH SERVICES | Facility: HOME HEALTH | Age: 65
End: 2020-08-28

## 2020-09-01 ENCOUNTER — HOME CARE/HOSPICE - HEALTHEAST (OUTPATIENT)
Dept: HOME HEALTH SERVICES | Facility: HOME HEALTH | Age: 65
End: 2020-09-01

## 2020-09-02 ENCOUNTER — HOME CARE/HOSPICE - HEALTHEAST (OUTPATIENT)
Dept: ADMINISTRATIVE | Facility: OTHER | Age: 65
End: 2020-09-02

## 2020-09-02 ENCOUNTER — HOME CARE/HOSPICE - HEALTHEAST (OUTPATIENT)
Dept: HOME HEALTH SERVICES | Facility: HOME HEALTH | Age: 65
End: 2020-09-02

## 2020-09-04 ENCOUNTER — HOME CARE/HOSPICE - HEALTHEAST (OUTPATIENT)
Dept: ADMINISTRATIVE | Facility: OTHER | Age: 65
End: 2020-09-04

## 2020-09-05 ENCOUNTER — HOME CARE/HOSPICE - HEALTHEAST (OUTPATIENT)
Dept: HOME HEALTH SERVICES | Facility: HOME HEALTH | Age: 65
End: 2020-09-05

## 2020-09-08 ENCOUNTER — HOME CARE/HOSPICE - HEALTHEAST (OUTPATIENT)
Dept: HOME HEALTH SERVICES | Facility: HOME HEALTH | Age: 65
End: 2020-09-08

## 2020-09-09 ENCOUNTER — HOME CARE/HOSPICE - HEALTHEAST (OUTPATIENT)
Dept: HOME HEALTH SERVICES | Facility: HOME HEALTH | Age: 65
End: 2020-09-09

## 2020-09-10 ENCOUNTER — HOME CARE/HOSPICE - HEALTHEAST (OUTPATIENT)
Dept: HOME HEALTH SERVICES | Facility: HOME HEALTH | Age: 65
End: 2020-09-10

## 2020-09-11 ENCOUNTER — HOME CARE/HOSPICE - HEALTHEAST (OUTPATIENT)
Dept: HOME HEALTH SERVICES | Facility: HOME HEALTH | Age: 65
End: 2020-09-11

## 2020-09-13 ENCOUNTER — HOME CARE/HOSPICE - HEALTHEAST (OUTPATIENT)
Dept: HOME HEALTH SERVICES | Facility: HOME HEALTH | Age: 65
End: 2020-09-13

## 2020-09-17 ENCOUNTER — COMMUNICATION - HEALTHEAST (OUTPATIENT)
Dept: HOME HEALTH SERVICES | Facility: HOME HEALTH | Age: 65
End: 2020-09-17

## 2020-11-29 ENCOUNTER — ANESTHESIA - HEALTHEAST (OUTPATIENT)
Dept: INTENSIVE CARE | Facility: CLINIC | Age: 65
End: 2020-11-29

## 2020-11-29 ENCOUNTER — RECORDS - HEALTHEAST (OUTPATIENT)
Dept: INTENSIVE CARE | Facility: CLINIC | Age: 65
End: 2020-11-29

## 2020-11-30 ENCOUNTER — COMMUNICATION - HEALTHEAST (OUTPATIENT)
Dept: SCHEDULING | Facility: CLINIC | Age: 65
End: 2020-11-30

## 2020-12-06 ENCOUNTER — HOME CARE/HOSPICE - HEALTHEAST (OUTPATIENT)
Dept: HOME HEALTH SERVICES | Facility: HOME HEALTH | Age: 65
End: 2020-12-06

## 2021-05-09 ENCOUNTER — HEALTH MAINTENANCE LETTER (OUTPATIENT)
Age: 66
End: 2021-05-09

## 2021-05-26 VITALS
DIASTOLIC BLOOD PRESSURE: 86 MMHG | OXYGEN SATURATION: 96 % | RESPIRATION RATE: 18 BRPM | TEMPERATURE: 98.2 F | SYSTOLIC BLOOD PRESSURE: 128 MMHG

## 2021-05-27 VITALS
SYSTOLIC BLOOD PRESSURE: 128 MMHG | DIASTOLIC BLOOD PRESSURE: 86 MMHG | HEART RATE: 88 BPM | TEMPERATURE: 98.1 F | OXYGEN SATURATION: 96 %

## 2021-05-30 ENCOUNTER — RECORDS - HEALTHEAST (OUTPATIENT)
Dept: ADMINISTRATIVE | Facility: CLINIC | Age: 66
End: 2021-05-30

## 2021-05-31 VITALS — BODY MASS INDEX: 29.53 KG/M2 | WEIGHT: 200 LBS

## 2021-05-31 VITALS — HEIGHT: 69 IN | BODY MASS INDEX: 37.16 KG/M2 | WEIGHT: 250.88 LBS

## 2021-05-31 VITALS — BODY MASS INDEX: 36.51 KG/M2 | WEIGHT: 246.5 LBS | HEIGHT: 69 IN

## 2021-06-01 VITALS — BODY MASS INDEX: 36.88 KG/M2 | HEIGHT: 69 IN | WEIGHT: 249 LBS

## 2021-06-02 ENCOUNTER — RECORDS - HEALTHEAST (OUTPATIENT)
Dept: ADMINISTRATIVE | Facility: CLINIC | Age: 66
End: 2021-06-02

## 2021-06-04 NOTE — PROGRESS NOTES
This is a recent snapshot of the patient's [facility] medical record.  For current drug dose and complete information and questions, call [phone] or In Basket pool, fv home infusion ([id number])  CSN Number:  [id number]     risk factors
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
## 2021-06-11 NOTE — ANESTHESIA PROCEDURE NOTES
Spinal Block    Start time: 7/16/2017 11:25 AM  End time: 7/16/2017 11:31 AM  Reason for block: primary anesthetic    Staffing:  Performing  Anesthesiologist: MIS AMES    Preanesthetic Checklist  Completed: patient identified, risks, benefits, and alternatives discussed, timeout performed, consent obtained, airway assessed, oxygen available, suction available, emergency drugs available and hand hygiene performed  Spinal Block  Patient position: sitting  Prep: Betadine  Patient monitoring: heart rate, cardiac monitor, continuous pulse ox and blood pressure  Approach: right paramedian  Location: L4-5  Injection technique: single-shot  Needle type: pencil-tip   Needle gauge: 24 G    Assessment  Sensory level: T8

## 2021-06-11 NOTE — ANESTHESIA CARE TRANSFER NOTE
Last vitals:   Vitals:    07/16/17 1335   BP: 123/56   Pulse: 72   Resp: 16   Temp: 36.1  C (97  F)   SpO2: 99%     Patient's level of consciousness is awake  Spontaneous respirations: yes  Maintains airway independently: yes  Dentition unchanged: yes  Oropharynx: oropharynx clear of all foreign objects    QCDR Measures:  ASA# 20 - Surgical Safety Checklist: ASA20A - Safety Checks Done  PQRS# 430 - Adult PONV Prevention: 4558F - Pt received => 2 anti-emetic agents (different classes) preop & intraop  ASA# 8 - Peds PONV Prevention: NA - Not pediatric patient, not GA or 2 or more risk factors NOT present  PQRS# 424 - Nay-op Temp Management: 4559F - At least one body temp DOCUMENTED => 35.5C or 95.9F within required timeframe  PQRS# 426 - PACU Transfer Protocol: - Transfer of care checklist used  ASA# 14 - Acute Post-op Pain: no pain
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
## 2021-06-11 NOTE — ANESTHESIA PREPROCEDURE EVALUATION
Anesthesia Evaluation      Patient summary reviewed   No history of anesthetic complications     Airway   Mallampati: II  Neck ROM: full   Pulmonary - normal exam    breath sounds clear to auscultation  (+) a smoker                         Cardiovascular - normal exam  ECG reviewed        Neuro/Psych      Endo/Other    (+) obesity,      GI/Hepatic/Renal    (+) GERD,             Dental - normal exam                        Anesthesia Plan  Planned anesthetic: spinal    ASA 3 - emergent     Anesthetic plan and risks discussed with: patient    Post-op plan: routine recovery

## 2021-06-11 NOTE — ANESTHESIA POSTPROCEDURE EVALUATION
Patient: Nicole Krishna  OPEN REDUCTION INTERNAL FIXATION OF RIGHT FEMUR FRACTURE, REMOVAL OF ALL RIGHT HIP COMPONENTS  Anesthesia type: spinal    Patient location: PACU  Last vitals:   Vitals:    07/16/17 2100   BP:    Pulse:    Resp:    Temp:    SpO2: 98%     Post vital signs: stable  Level of consciousness: sedated  Post-anesthesia pain: pain controlled  Post-anesthesia nausea and vomiting: no  Pulmonary: unassisted, return to baseline  Cardiovascular: stable and blood pressure at baseline  Hydration: adequate  Anesthetic events: no    QCDR Measures:  ASA# 11 - Nay-op Cardiac Arrest: ASA11B - Patient did NOT experience unanticipated cardiac arrest  ASA# 12 - Nay-op Mortality Rate: ASA12B - Patient did NOT die  ASA# 13 - PACU Re-Intubation Rate: ASA13B - Patient did NOT require a new airway mgmt  ASA# 10 - Composite Anes Safety: ASA10A - No serious adverse event  ASA# 38 - New Corneal Injury: ASA38A - No new exposure keratitis or corneal abrasion in PACU    Additional Notes:

## 2021-06-12 NOTE — PROGRESS NOTES
I have reviewed the notes, assessments, and/or procedures performed by Joseph, I concur with her/his documentation of Nicole Krishna.

## 2021-06-12 NOTE — ANESTHESIA CARE TRANSFER NOTE
Last vitals:   Vitals:    08/09/17 1327   BP: 114/55   Pulse: 74   Resp: 16   Temp: 36.3  C (97.3  F)   SpO2: 99%     Patient's level of consciousness is drowsy  Spontaneous respirations: yes  Maintains airway independently: yes  Dentition unchanged: yes  Oropharynx: oropharynx clear of all foreign objects    QCDR Measures:  ASA# 20 - Surgical Safety Checklist: WHO surgical safety checklist completed prior to induction  PQRS# 430 - Adult PONV Prevention: 4558F - Pt received => 2 anti-emetic agents (different classes) preop & intraop  ASA# 8 - Peds PONV Prevention: NA - Not pediatric patient, not GA or 2 or more risk factors NOT present  PQRS# 424 - Nay-op Temp Management: 4559F - At least one body temp DOCUMENTED => 35.5C or 95.9F within required timeframe  PQRS# 426 - PACU Transfer Protocol: - Transfer of care checklist used  ASA# 14 - Acute Post-op Pain: ASA14B - Patient did NOT experience pain >= 7 out of 10

## 2021-06-12 NOTE — PROGRESS NOTES
Diagnosis / code:  CHRONIC INFECTED PROSTHETIC HIP JOINT [T84.59]  Past medical history: anxiety, depression, GERD, HLD, smoker, obesity, anemia, septic arthritis, hip fracture, femur fracture  IV Access:     SL PICC                    (note: tPA should not be used in midlines--per  vascular committee)    Ordering physician (specialty): Dr. Gardner ( ID)                            phone#        fax#  Other physicians (specialties):                             phone#        fax#  Other physicians (specialties):                             phone#        fax#                                PHARMACIST ASSESSMENTS OF HOME INFUSION THERAPY:   needed for communication?  NO.    Therapeutic duplication with home infusion therapy meds?  NO.    Drug-disease contraindications?  NO.    Is this a first dose/need to send First Dose Kit (E-Kit)?  NO.    Inappropriate dose, frequency, route, duration?  NO.    Potential Drug-allergy interactions?  NO.    Potential Drug interactions with home infusion therapy?  NO.    Patient counseling?  WITH REFILLS    Pertinent Baseline and/or Recent Lab results?  see epic    Culture results?  see epic    Estimated/calculated creatinine clearance (ml/min):  94.7 mL/min (7/18/17)  [HE POLICY TO ROUND CREATININE UP TO 0.8MG/DL IF PATIENT >65 Y/0 AND CREATININE IS LESS THAN 0.8MG/DL]  [HE POLICY TO USE THE ADJUSTED BODY WEIGHT (or DOSING WT) IF ACTUAL WT > 20% IBW.]      Potential drug related problems and patient teaching:  SEE DRUG RELATED PROBLEM LIST    Goals/Outcomes of IV therapy:  SEE DRUG RELATED PROBLEM LIST    Method of drug delivery used (i.e. homepump, IV push, [place]lin):      date -- method --  notes or comments  7/21/17 CADD PUMP FOR CONTINUOUS INFUSION

## 2021-06-12 NOTE — ANESTHESIA PROCEDURE NOTES
Spinal Block    Patient location during procedure: OR  Start time: 8/9/2017 10:31 AM  End time: 8/9/2017 10:35 AM  Reason for block: primary anesthetic    Staffing:  Performing  Anesthesiologist: ISAK GREEN    Preanesthetic Checklist  Completed: patient identified, risks, benefits, and alternatives discussed, timeout performed, consent obtained, at patient's request, airway assessed, oxygen available, suction available, emergency drugs available and hand hygiene performed  Spinal Block  Patient position: sitting  Prep: ChloraPrep  Patient monitoring: blood pressure, continuous pulse ox and heart rate  Approach: left paramedian  Location: L4-5  Injection technique: single-shot  Needle type: Quincke   Needle gauge: 22 G

## 2021-06-12 NOTE — ANESTHESIA PREPROCEDURE EVALUATION
Anesthesia Evaluation      Patient summary reviewed     Airway   Mallampati: II   Pulmonary - normal exam   (+) asthma  a smoker                         Cardiovascular - normal exam  (+) hypertension, ,     Rhythm: regular  Rate: normal,         Neuro/Psych    (+) depression, anxiety/panic attacks,     Comments: EtOH abuse hx      Endo/Other    (+) arthritis, obesity,      GI/Hepatic/Renal    (+) GERD,        Other findings: Prosthetic hip infection      Dental - normal exam                        Anesthesia Plan  Planned anesthetic: spinal    ASA 3     Anesthetic plan and risks discussed with: patient    Post-op plan: routine recovery        Anesthesia Evaluation      Patient summary reviewed   No history of anesthetic complications     Airway   Mallampati: II  Neck ROM: full   Pulmonary - normal exam    breath sounds clear to auscultation  (+) a smoker                         Cardiovascular - normal exam  ECG reviewed        Neuro/Psych      Endo/Other    (+) obesity,      GI/Hepatic/Renal    (+) GERD,             Dental - normal exam             Chemistry        Component Value Date/Time     (L) 08/09/2017 0423    K 4.0 08/09/2017 0423     08/09/2017 0423    CO2 24 08/09/2017 0423    BUN 11 08/09/2017 0423    CREATININE 0.63 08/09/2017 0423     08/09/2017 0423        Component Value Date/Time    CALCIUM 9.0 08/09/2017 0423    ALKPHOS 160 (H) 08/03/2017 1621    AST 14 08/03/2017 1621    ALT 12 08/03/2017 1621    BILITOT 0.4 08/03/2017 1621        Lab Results   Component Value Date    WBC 9.0 08/09/2017    HGB 8.1 (L) 08/09/2017    HCT 27.4 (L) 08/09/2017    MCV 91 08/09/2017     08/09/2017                  Anesthesia Plan  Planned anesthetic: spinal    ASA 3 -     Anesthetic plan and risks discussed with: patient    Post-op plan: routine recovery

## 2021-06-12 NOTE — ANESTHESIA POSTPROCEDURE EVALUATION
Patient: Nicole Krishna  OPEN REDUCTION INTERNAL FIXATION PERIPROSTHETIC RIGHT FEMUR FRACTURE, debridement deep tissue, antibiotic beads placement  Anesthesia type: spinal    Patient location: PACU  Last vitals:   Vitals:    08/09/17 1400   BP: 124/71   Pulse: 74   Resp: 18   Temp:    SpO2:      Post vital signs: stable  Level of consciousness: awake and responds to simple questions  Post-anesthesia pain: pain controlled  Post-anesthesia nausea and vomiting: no  Pulmonary: unassisted, return to baseline  Cardiovascular: stable and blood pressure at baseline  Hydration: adequate  Anesthetic events: no    QCDR Measures:  ASA# 11 - Nay-op Cardiac Arrest: ASA11B - Patient did NOT experience unanticipated cardiac arrest  ASA# 12 - Nya-op Mortality Rate: ASA12B - Patient did NOT die  ASA# 13 - PACU Re-Intubation Rate: NA - No ETT / LMA used for case  ASA# 10 - Composite Anes Safety: ASA10A - No serious adverse event  ASA# 38 - New Corneal Injury: ASA38A - No new exposure keratitis or corneal abrasion in PACU    Additional Notes:

## 2021-06-13 NOTE — ANESTHESIA PROCEDURE NOTES
Feeding Tube Insertion    Performing CRNA:  Gladis Biggs, CRNA  Requesting Provider:  Maira Sultana CNP  Position:  Supine  Tube Type:  Nasogastric  Tube Size:  10 Fr  Placement/position confirmed:  X-ray  Number of Attempts:  2  Difficulty:  Minimal  Secured with: bridle.  Flushed with:  Water   NG tube placed at bedside at request of provider. Confirmation per XR to be completed. Bridled in place. Handoff to staff. At 95 cm.

## 2021-06-13 NOTE — ANESTHESIA PROCEDURE NOTES
Emergent Intubation    Date/Time: 11/29/2020 1:50 PM    CRNA: Beka Levine CRNA  Indications: respiratory distress    Medications Administered  Propofol (DIPRIVAN) injection, 100 mgMedication administration time:11/29/2020 1:50 PMRoute: oral  Technique: direct  Tube size: 7.5 mm  Tube type: cuffed  Cuff inflated: yes  Level of Difficulty: 0ETT to lip: 23 cm  Tube secured with: ETT marcos (by RT)  ETCO2 = Yes  Breath sounds: equal  SaO2 %: 95    Sign out given. CXR and sedation per primary care team.  Comments: Called to bedside for intubation. 50 mg Propofol administered during preoxygenation d/t non compliance with mask. Then induced with 100 mg Propofol and 100 mg Adolfo by CRNAs. Intubated without difficulty using Glidescope 3 blade and 7.5 ETT. Signoff to ICU staff for confirmation of tube placement.

## 2021-06-13 NOTE — PROGRESS NOTES
Infectious Disease Progress Note  11/6/2017    Assessment/Plan    Patient was seen by Dr. Donovan on 8/11/17.     1. Hx of  R femur periprosthetic fracture/ hematoma / blood loss, after fall  2. Infected rt prosthetic hip, proximal femur fracture, Enterococcus faecalis, status post explant of hardware 7/16. Cerclage wire in plance.  Procedure 7/16:  1.  Open reduction internal fixation periprosthetic right femur fracture  2.  Explantation/removal of all SHA implants, right hip  3.  Radical debridement for chronic prosthetic joint infection  Had been on cont  infusion IV ampicillin, completed 8/27/17.     Hx of Candida prosthetic joint infection-- had been on Fluconazole    Now with:  New fracture  Had OPEN REDUCTION INTERNAL FIXATION PERIPROSTHETIC RIGHT FEMUR FRACTURE, debridement deep tissue, antibiotic beads placement 8/9: cultures not sent  Right calf vein DVT       Cellulitis thigh periincisional preop>> redness resolved now- preop culture with gram negatives   She does hav hx tape allergy too  E coli bacteriuria, low grade (bedpan?) , UA neg, asymptomatic  On dapto  (8/7) and cefepime (8/9) while inpatient.  Switched cefepime to levaquin x 5 days on discharge from [facility] on 8/12/17.   Ongoing drainage from the wound at this time-- patient is out of dressing supplies. Needs follow up with Orthopedic surgery.    See ID consult from 7/28/17      PLAN:     Wound cultures taken in clinic  Empiric Ciprofloxacin and needs urgent follow up with Orthopedic surgery  Labs today  Orders Placed This Encounter   Procedures     Wound culture, gram stain     Comprehensive metabolic panel     Sedimentation rate, automated     C-reactive protein     Continue fluconazole  Discussed with the patient. Follow up in 3-4 weeks, earlier if needed.     total time spent 40 min > 50% counseling and coordinating care     Cecelia Muhammad MD  [facility]  [phone]        Subjective  Draining from  the incision, and using pads and scotch tape to cover it as out of dressing supplies from Ortho clinic.  Supplies given from ID clinic and recommended follow up with Orthopedic  Unable to walk much without a wheelchair    PMHx/SHx/FHx: reviewed        Objective    Vital signs in last 24 hours  Temp:  [97.7  F (36.5  C)-98.4  F (36.9  C)] 98.2  F (36.8  C)  Heart Rate:  [73-94] 79  Resp:  [16-18] 16  BP: (107-125)/(52-68) 116/56  Weight:        Intake/Output last 3 shifts  I/O last 3 completed shifts:  In: 1495 [P.O.:480; I.V.:1015]  Out: 1075 [Urine:700; Drains:375]  Intake/Output this shift:  I/O this shift:  In: 10 [I.V.:10]  Out: 30 [Drains:30]    Review of Systems   A 12 point comprehensive review of systems was negative except as noted.  Past medical, family, and social history reviewed    Physical Exam  Gen. appearance nontoxic  Eyes no conjunctivitis or icterus  Neck no stiffness or neck vein distention, no LN  Abdomen soft not tender  Extremities rt hip : serosanguinous drainage from the hip/thigh  incision on R  Skin  Indurated skin  Neurologic alert oriented no focal deficits      Pertinent Labs   Lab Results: personally reviewed.   Lab Results   Component Value Date     08/23/2017    K 3.9 08/23/2017     (H) 08/23/2017    CO2 22 08/23/2017    BUN 12 08/23/2017    CREATININE 0.73 08/23/2017    CALCIUM 9.0 08/23/2017     Lab Results   Component Value Date    WBC 8.2 08/23/2017    WBC 8.9 08/12/2017    WBC 10.1 08/11/2017    WBC 7.1 06/07/2015    WBC 7.5 05/04/2015    WBC 6.1 01/12/2015    HGB 8.0 (L) 08/23/2017    HGB 7.4 (L) 08/12/2017    HGB 7.5 (L) 08/11/2017    HCT 27.1 (L) 08/23/2017    HCT 25.1 (L) 08/12/2017    HCT 24.4 (L) 08/11/2017    MCV 88 08/23/2017    MCV 91 08/12/2017    MCV 90 08/11/2017     (H) 08/23/2017     08/12/2017     08/11/2017     Culture/Gram Stain: Wound (Aerobic)   Status:  Final result   Visible to patient:  Yes (MyChart) Next appt:  None Order:  [id number]     Culture      4+ Escherichia coli (!)          4+ Pseudomonas aeruginosa (!)          3+ Enterobacter cloacae (!)          1+ Coagulase negative Staphylococcus (!)            Stain      2+ Polymorphonuclear leukocytes          4+ Gram negative bacilli          2+ Gram positive cocci in pairs              Resulting Agency: [facility]       Susceptibility       Escherichia coli Pseudomonas aeruginosa       EVA EVA       Amoxicillin + Clavulanate 16/8  Intermediate        Ampicillin >16  Resistant        Aztreonam  4  Sensitive       Cefazolin >16  Resistant        Cefepime <=1  Sensitive 2  Sensitive       Ceftazidime  <=2  Sensitive       Ceftriaxone <=1  Sensitive        Ciprofloxacin <=0.5  Sensitive <=0.5  Sensitive       Gentamicin <=2  Sensitive <=2  Sensitive       Levofloxacin <=1  Sensitive <=1  Sensitive       Meropenem <=0.25  Sensitive 1  Sensitive       Piperacillin + Tazobactam 64/4  Intermediate 4/4  Sensitive       Tetracycline <=2  Sensitive        Tobramycin <=2  Sensitive <=2  Sensitive       Trimethoprim + Sulfamethoxazole <=0.5  Sensitive                    Susceptibility       Enterobacter cloacae       EVA       Amoxicillin + Clavulanate >16/8  Resistant       Ampicillin >16  Resistant       Cefazolin >16  Resistant       Cefepime <=1  Sensitive       Ceftriaxone <=1  Sensitive       Ciprofloxacin <=0.5  Sensitive       Gentamicin <=2  Sensitive       Levofloxacin <=1  Sensitive       Meropenem <=0.25  Sensitive       Piperacillin + Tazobactam <=2/4  Sensitive       Tetracycline <=2  Sensitive       Tobramycin <=2  Sensitive       Trimethoprim + Sulfamethoxazole <=0.5  Sensitive                    Specimen Collected: 08/06/17  2:44 PM Last Resulted: 08/11/17  9:05 AM                Pertinent Radiology   Radiology Results: Personally reviewed image/s  CT hip 7/22  IMPRESSION:   CONCLUSION:  1.  Interval postoperative changes of removal of the right hip arthroplasty components since the  examination 7/13/2017. There is been removal of both the femoral and acetabular component.  2.  Additional interval postoperative changes of cerclage wire fixation about the proximal femur.  3.  There has been a fracture of the proximal femoral diaphyseal shaft just below the proximal most cerclage wire which is approximately 6 cm below the postoperative margin of the upper femur.  4.  There is medial subluxation of the distal fracture fragment with up to 3.6 cm of foreshortening and a significant rotatory component with the distal fracture fragment and remainder of the right femoral shaft rotated externally essentially 90 degrees   relative to the intertrochanteric region.  5.  There is a large hematoma at the fracture site extending into the old right hip joint.  6.  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  7.  No additional fractures are identified.

## 2021-06-14 NOTE — PROGRESS NOTES
CALLED PT AND PT DAUGHTER [facility] ASAP. CALLED SEVERAL TIMES WITH NO RESULTS. A POLICE WELFARE CHECK WOULD BE ANOTHER POSSIBILITY. AS OF NOW STILL WAITING FOR A RETURN CALL.

## 2021-06-14 NOTE — H&P
[facility] Radiology Pre-procedure Note    Procedure Requested: abscessogram  Requesting Provider: Maira Kasper CNP    HPI: Nicole Krishna is a 62 y.o. female with chronic right prosthetic hip infection (H/O multiple hip surgeries) being treated by ID service as OP. Recently admitted to hospital for anemia/PRBC transfusion with worsening right hip pain. CT imaging 17 concerning for hip fluid collection/possible hematoma s/p aspiration and 8F drain placement on 17. Patient returns today for routine CT and abscessogram.     IMAGIN17 Hip CT: PENDING    PROCEDURE: Right hip aspiration and drain placement.  2017 4:16 PM     INDICATION: Complicated surgical history regarding the right hip. Recent CT shows a complex fluid collection.  SEDATION: Versed 1 mg. Fentanyl 50 mcg. The procedure was performed with administration intravenous conscious sedation with appropriate preoperative, intraoperative, and postoperative evaluation.  15 minutes of supervised face to face conscious sedation time was provided by a radiology nurse under my direct supervision.  FLUOROSCOPIC TIME: 1.6 minutes.  DOSE: (Air Kerma) 76 mGy.  CONTRAST: 5 mL Omnipaque 350.     PROCEDURES:  1. Right hip joint aspiration and drain placement.  2. Ultrasound guidance for needle placement.     STERILE BARRIER TECHNIQUE: Maximum sterile barrier technique was used. Cutaneous antisepsis was performed at the operative area with application of 2% chlorhexidine and large sterile drape. Prior to the procedure the surgeon and assistant performed hand   hygiene and wore hat, mask, sterile gown, and sterile gloves during the entire procedure.     SUMMARY: After discussing the procedure and risks, informed consent was obtained.      The right hip area was examined with ultrasound, confirming a complex fluid collection. The region was prepped and draped. After local anesthesia, a 5 [place] Helen[place] Yueh needle was advanced into the fluid collection under  direct ultrasound guidance. Old blood   was aspirated, and a sample sent for Gram stain and culture. Contrast injection fills an irregular fluid collection around the proximal femur. The tract was dilated, and an 8 Faroese locking pigtail drain was advanced into the fluid collection under   fluoroscopic guidance. Spot images were obtained for documentation. The drain was secured and attached to a suction bulb.     IMPRESSION:   1.  Uneventful right hip aspiration, with drain placement. A sample of old bloody fluid was sent for Gram stain and culture.    NPO Status:   Anticoagulation/Antiplatelets/Bleeding tendencies: none  Antibiotics: rochephin        PAST MEDICAL HISTORY:   Past Medical History:   Diagnosis Date     Alcohol abuse      Arthritis      Asthma      Basal cell carcinoma      Depression      Femur fracture, right      History of transfusion      Hyperlipemia      MRSA (methicillin resistant Staphylococcus aureus)        PAST SURGICAL HISTORY:  Past Surgical History:   Procedure Laterality Date     CHOLECYSTECTOMY  2009     HH MIDLINE INSERTION  11/24/2017          HIP ARTHROPLASTY Right 07/23/2014     JOINT REPLACEMENT       ORIF FEMUR FRACTURE Right 8/9/2017    Procedure: OPEN REDUCTION INTERNAL FIXATION PERIPROSTHETIC RIGHT FEMUR FRACTURE, debridement deep tissue, antibiotic beads placement;  Surgeon: Erikc Dean MD;  Location: [facility];  Service:      OTHER SURGICAL HISTORY      skin cancer removal on back     PIC  9/14/2014          PIC  6/29/2015          PIC  7/19/2017          REVISION TOTAL HIP ARTHROPLASTY Right 7/16/2017    Procedure: OPEN REDUCTION INTERNAL FIXATION OF RIGHT FEMUR FRACTURE, REMOVAL OF ALL RIGHT HIP COMPONENTS;  Surgeon: Erick Dean MD;  Location: [facility];  Service:      TUBAL LIGATION         ALLERGIES:  Blood-group specific substance and Adhesive    MEDICATIONS:  Current Outpatient Prescriptions   Medication Sig Dispense Refill      acetaminophen (TYLENOL) 650 MG CR tablet Take 650 mg by mouth every 8 (eight) hours as needed for pain.       albuterol (PROVENTIL HFA;VENTOLIN HFA) 90 mcg/actuation inhaler Inhale 2 puffs every 4 (four) hours as needed.        cane Ela Single Point Cane for home use. For 12 weeks.       cefTRIAXone (ROCEPHIN) Infuse 2 g into a venous catheter daily. 78 g 0     cholecalciferol, vitamin D3, 1,000 unit tablet Take 2,000 Units by mouth daily.       docusate sodium (COLACE) 100 MG capsule Take 100 mg by mouth daily as needed.        ferrous sulfate 325 (65 FE) MG tablet Take 1 tablet (325 mg total) by mouth daily with breakfast.  0     fluconazole (DIFLUCAN) 100 MG tablet Take 100 mg by mouth daily.       FLUoxetine (PROZAC) 40 MG capsule Take 80 mg by mouth daily.       glucosamine-chondroitin (COSAMIN DS) 500-400 mg tablet Take 1 tablet by mouth 3 (three) times a day.        Lactobacillus rhamnosus GG (CULTURELLE) 10-15 Billion cell capsule Take 1 capsule by mouth daily.       meloxicam (MOBIC) 15 MG tablet Take 15 mg by mouth daily.       multivitamin (MULTIVITAMIN) per tablet Take 1 tablet by mouth daily.        omega 3-dha-epa-fish oil (FISH OIL) 60- mg cap capsule Take 1,000 mg by mouth daily.       oxyCODONE-acetaminophen (PERCOCET) 5-325 mg per tablet Take 1-2 tablets by mouth every 6 (six) hours as needed. 60 tablet 0     prochlorperazine (COMPAZINE) 10 MG tablet Take 10 mg by mouth every 8 (eight) hours as needed.       ranitidine (ZANTAC) 150 MG tablet Take 150 mg by mouth 2 (two) times a day as needed for heartburn.       No current facility-administered medications for this encounter.      Facility-Administered Medications Ordered in Other Encounters   Medication Dose Route Frequency Provider Last Rate Last Dose     naloxone 0.4 mg (1 mL) (NARCAN)  0.1 mg Intravenous PRN Grant Ugalde PA-C        Or     nalOXone injection 0.1-0.4 mg (NARCAN)  0.1-0.4 mg Intramuscular PRN Grant Ugalde  PA-C           LABS:  INR (no units)   Date Value   11/22/2017 1.08     Hemoglobin (g/dL)   Date Value   11/25/2017 8.2 (L)     Platelets (thou/uL)   Date Value   11/25/2017 476 (H)     Creatinine (mg/dL)   Date Value   11/20/2017 0.69     Potassium (mmol/L)   Date Value   11/20/2017 3.7       EXAM:      ASSESSMENT: 62 with chronic right prosthetic hip infection (H/O multiple hip surgeries) s/p drain placement 11/22/17; here for CT and abscessogram.     PLAN: Abscessogram with sedation as needed and possible reposition, exchange, and/or removal of tubing.     The procedure, risks and moderate sedation were discussed with the patient, all questions answered and patient agrees to proceed with the procedure. Written consent obtained.      Chart review and note compiled by:   Yesica Feldman CNP  Interventional Radiology    Exam and consent obtained by:   Dr. Eagle

## 2021-06-14 NOTE — PROGRESS NOTES
The police were called for a welfare check., turns out pt phone not working so got none of the messages sent to her. Pt was given the message Dr Parks wanted her to act on.

## 2021-06-14 NOTE — PROGRESS NOTES
CALLED PT'S DAUGHTER  BUT DAUGHTERS PHONE IS D/C OR DOSE NOT ACCEPT OUTSIDE CALLS. LEFT ANOTHER MESSAGE ON PT;S PHONE AND ASKED HER TO CALL WHEN SHE RECEIVES MESSAGE.

## 2021-06-14 NOTE — H&P
[facility] Radiology History and Physical Note    Procedure Requested: Abscessogram   Requesting Provider: Maira Kasper CNP    HPI: Nicole Krishna is a 62 y.o. old female  with chronic right prosthetic hip infection (H/O multiple hip surgeries) with recent hospitalization for right hip abscess in November s/p 8 fr right hip drain placement 11-22-17. Presents for F/U abscessogram.     Reports large drain OP approx 80 ml/day daily until few days ago drain suction bulb no longer holds suction and no drain OP. Denies fevers. Denies leaking with drain flushes. On IV antibiotics per ID.      Drain OP: 80ml/day  Flush: 10 ml/day  Culture 11-22-17:   Streptococcus agalactiae (Group B Streptococcus)    IMAGING:     NPO Status: MN  Anticoagulation/Antiplatelets/Bleeding tendencies: ASA  Antibiotics: per ID    REVIEW OF SYMPTOMS: as above otherwise remainder 10 point ROS negative     PAST MEDICAL HISTORY:   Past Medical History:   Diagnosis Date     Alcohol abuse      Arthritis      Asthma      Basal cell carcinoma      Depression      Femur fracture, right      History of transfusion      Hyperlipemia      MRSA (methicillin resistant Staphylococcus aureus)        PAST SURGICAL HISTORY:  Past Surgical History:   Procedure Laterality Date     CHOLECYSTECTOMY  2009     HH MIDLINE INSERTION  11/24/2017          HIP ARTHROPLASTY Right 07/23/2014     JOINT REPLACEMENT       ORIF FEMUR FRACTURE Right 8/9/2017    Procedure: OPEN REDUCTION INTERNAL FIXATION PERIPROSTHETIC RIGHT FEMUR FRACTURE, debridement deep tissue, antibiotic beads placement;  Surgeon: Erick Dean MD;  Location: [facility];  Service:      OTHER SURGICAL HISTORY      skin cancer removal on back     PICC  9/14/2014          PICC  6/29/2015          PICC  7/19/2017          REVISION TOTAL HIP ARTHROPLASTY Right 7/16/2017    Procedure: OPEN REDUCTION INTERNAL FIXATION OF RIGHT FEMUR FRACTURE, REMOVAL OF ALL RIGHT HIP COMPONENTS;  Surgeon: Erick Dean MD;   Location: [facility];  Service:      TUBAL LIGATION         ALLERGIES:  Blood-group specific substance and Adhesive    MEDICATIONS:  Current Outpatient Prescriptions   Medication Sig Dispense Refill     acetaminophen (TYLENOL) 650 MG CR tablet Take 650 mg by mouth every 8 (eight) hours as needed for pain.       albuterol (PROVENTIL HFA;VENTOLIN HFA) 90 mcg/actuation inhaler Inhale 2 puffs every 4 (four) hours as needed.        cane Ela Single Point Cane for home use. For 12 weeks.       cefTRIAXone (ROCEPHIN) Infuse 2 g into a venous catheter daily. 78 g 0     cholecalciferol, vitamin D3, 1,000 unit tablet Take 2,000 Units by mouth daily.       docusate sodium (COLACE) 100 MG capsule Take 100 mg by mouth daily as needed.        ferrous sulfate 325 (65 FE) MG tablet Take 1 tablet (325 mg total) by mouth daily with breakfast.  0     fluconazole (DIFLUCAN) 100 MG tablet Take 100 mg by mouth daily.       FLUoxetine (PROZAC) 40 MG capsule Take 80 mg by mouth daily.       glucosamine-chondroitin (COSAMIN DS) 500-400 mg tablet Take 1 tablet by mouth 3 (three) times a day.        Lactobacillus rhamnosus GG (CULTURELLE) 10-15 Billion cell capsule Take 1 capsule by mouth daily.       meloxicam (MOBIC) 15 MG tablet Take 15 mg by mouth daily.       multivitamin (MULTIVITAMIN) per tablet Take 1 tablet by mouth daily.        omega 3-dha-epa-fish oil (FISH OIL) 60- mg cap capsule Take 1,000 mg by mouth daily.       oxyCODONE-acetaminophen (PERCOCET) 5-325 mg per tablet Take 1-2 tablets by mouth every 6 (six) hours as needed. 60 tablet 0     prochlorperazine (COMPAZINE) 10 MG tablet Take 10 mg by mouth every 8 (eight) hours as needed.       ranitidine (ZANTAC) 150 MG tablet Take 150 mg by mouth 2 (two) times a day as needed for heartburn.       No current facility-administered medications for this encounter.      Facility-Administered Medications Ordered in Other Encounters   Medication Dose Route Frequency  Provider Last Rate Last Dose     iohexol 350 mg iodine/mL injection 100 mL (OMNIPAQUE)  100 mL Intravenous Once in imaging Arianna Parker MD         naloxone 0.4 mg (1 mL) (NARCAN)  0.1 mg Intravenous PRN Grant Ugalde PA-C        Or     nalOXone injection 0.1-0.4 mg (NARCAN)  0.1-0.4 mg Intramuscular PRN Grant Ugalde PA-C           LABS:NONE  EXAM:  /66  Pulse 85  Temp 98.8  F (37.1  C) (Oral)   Resp 16  SpO2 96%    General: Stable. In no acute distress.  Neuro: A&O x 3.   Resp: Lungs clear to auscultation bilaterally.  Cardio: S1S2 and reg, without murmur, clicks or rubs  Abdomen: Soft, non-distended, non-tender, positive bowel sounds.  Drain-CDI to bulb suction that is not retaining suction      Mallampati Airway Classification: Class 2: upper half of tonsil fossa visible  Previous reaction to anesthesia/sedation: no  Sedation plan based on assessment: Moderate  Sleep Apnea: no  Dentures: no  COPD: no  ASA Classification: ASA 3 - Patient with moderate systemic disease with functional limitations  Comments: Positive H/O asthma      ASSESSMENT: 62 with chronic right prosthetic hip infection (H/O multiple hip surgeries) s/p drain placement 11/22/17; here for CT and abscessogram.     CT pending     PLAN:     Case reviewed with Dr. Patton  Abscessogram with sedation as needed and possible reposition, exchange, and/or removal of tubing.      The procedure, risks and moderate sedation as needed were discussed with the patient, all questions answered and patient agrees to proceed with the procedure. Written consent obtained.    Maira Kasper CNP  [facility]: Interventional Radiology   [phone]

## 2021-06-14 NOTE — PROCEDURES
Interventional Radiology Post-Procedure Note   [facility]  ?   Brief Procedure Note:   Patient name: Nicole Krishna  Pt MRN:[id number]   Date of procedure: 12/20/2017     Procedure(s): Image guided drain placement after right hip sinogram  Sedation method: Moderate sedation was employed. The patient was monitored by an interventional radiology nurse at all times throughout the procedure under my direct guidance.  Pre Procedure Diagnosis: Right hip abscess  Post Procedure Diagnosis: Same  Indications: Retracted/malpositioned prior right hip drain Need for image guided percutaneous drainage   ?   Attending: Mat Patton M.D.  Specimen(s) removed: None   Additional studies ordered: None  Drains: 10 Fr locking pigtail drain  Estimated Blood Loss: Minimal  Complications: None  Vascular closure method: N/A    Findings/Notes/Comments: Uncomplicated placement of image guided percutaneous drain in right hip abscess. Drain to gravity bag. Flush with 10cc of normal saline twice a day. Please plan to have patient come to interventional radiology in one week for an abscessogram and a CT with contrast for follow up.   ?   Please see dictation in PACS or under the Imaging tab in [facility] for detailed procedure note.     Mat Patton M.D.   Vascular and Interventional Radiology   Pager: [phone]   After Hours / Scheduling: [phone]     12/20/2017  2:19 PM

## 2021-06-14 NOTE — PROGRESS NOTES
Called pt ; phone still go directly to answering and have had no response from pt . No action has been taken so far.

## 2021-06-15 NOTE — H&P
[facility] Radiology History and Physical Note  Date/Time: 1/3/2018/1:10 PM    Procedure Requested: Abscessogram  Requesting Provider: Maira Kasper CNP    HPI: Nicole Krishna is a 62 y.o. female with chronic right prosthetic hip infection (H/O multiple hip surgeries) with recent hospitalization for right hip abscess in November s/p 8 fr right hip drain placement 11/22/17. Follow up abscessogram on 12/20/17 showed existing catheter was retracted and unable to gain access back into the fluid collection. A new 10 F drain was placed, to gravity. Patient presents today for follow up CT/Abscessogram. She denies problems with the tube leaking, pain, fevers or chills.     Drain outputs: ~60 ml   Flush: 10 ml NS BID  Culture 11/22/17: Streptococcus agalactiae (Group B Streptococcus)    IMAGING:   PROCEDURE:  1. ABSCESSOGRAM  2. ULTRASOUND AND FLUOROSCOPICALLY GUIDED DRAIN PLACEMENT INTO RIGHT HIP ABSCESS  3. MODERATE SEDATION     12/20/2017 2:42 PM     INDICATION: The patient is a 62-year-old female the history of chronic osteomyelitis of the right hip. The patient had a drain placement performed on 11/22/2017 with a repeat CT today. CT today demonstrates improved positioning with retraction of the   drainage catheter. The patient presents for abscessogram and likely new drain placement.      IMPRESSION:   1. Existing drainage catheter appears to have been retracted outside the right hip abscess. Unable to gain access back into the infected fluid collection from the existing access.  2. Uncomplicated image guided placement of 10 Spanish drainage catheter. This catheter should remain to gravity bag  drainage and be flushed with 10 mL of normal saline 2 times a day. The patient should follow-up with interventional radiology in one week   for abscessogram and possible drain exchange/removal.    NPO Status: MN  Anticoagulation/Antiplatelets/Bleeding tendencies: None  Antibiotics: IV Rocephin    REVIEW OF SYMPTOMS: Denies recent  fevers, chills, night sweats, abdominal pain, N/V/D.    PAST MEDICAL HISTORY:   Past Medical History:   Diagnosis Date     Alcohol abuse      Arthritis      Asthma      Basal cell carcinoma      Depression      Femur fracture, right      History of transfusion      Hyperlipemia      MRSA (methicillin resistant Staphylococcus aureus)        PAST SURGICAL HISTORY:   Past Surgical History:   Procedure Laterality Date     CHOLECYSTECTOMY  2009     HH MIDLINE INSERTION  11/24/2017          HIP ARTHROPLASTY Right 07/23/2014     JOINT REPLACEMENT       ORIF FEMUR FRACTURE Right 8/9/2017    Procedure: OPEN REDUCTION INTERNAL FIXATION PERIPROSTHETIC RIGHT FEMUR FRACTURE, debridement deep tissue, antibiotic beads placement;  Surgeon: Erick Dean MD;  Location: [facility];  Service:      OTHER SURGICAL HISTORY      skin cancer removal on back     PICC  9/14/2014          PICC  6/29/2015          PICC  7/19/2017          REVISION TOTAL HIP ARTHROPLASTY Right 7/16/2017    Procedure: OPEN REDUCTION INTERNAL FIXATION OF RIGHT FEMUR FRACTURE, REMOVAL OF ALL RIGHT HIP COMPONENTS;  Surgeon: Erick Dean MD;  Location: [facility];  Service:      TUBAL LIGATION         ALLERGIES:  Blood-group specific substance and Adhesive    MEDICATIONS:  Current Outpatient Prescriptions   Medication Sig Dispense Refill     acetaminophen (TYLENOL) 650 MG CR tablet Take 650 mg by mouth every 8 (eight) hours as needed for pain.       albuterol (PROVENTIL HFA;VENTOLIN HFA) 90 mcg/actuation inhaler Inhale 2 puffs every 4 (four) hours as needed.        cane Ela Single Point Cane for home use. For 12 weeks.       cholecalciferol, vitamin D3, 1,000 unit tablet Take 2,000 Units by mouth daily.       docusate sodium (COLACE) 100 MG capsule Take 100 mg by mouth daily as needed.        ferrous sulfate 325 (65 FE) MG tablet Take 1 tablet (325 mg total) by mouth daily with breakfast.  0     fluconazole (DIFLUCAN) 100 MG tablet Take 100 mg  by mouth daily.       FLUoxetine (PROZAC) 40 MG capsule Take 80 mg by mouth daily.       glucosamine-chondroitin (COSAMIN DS) 500-400 mg tablet Take 1 tablet by mouth 3 (three) times a day.        Lactobacillus rhamnosus GG (CULTURELLE) 10-15 Billion cell capsule Take 1 capsule by mouth daily.       meloxicam (MOBIC) 15 MG tablet Take 15 mg by mouth daily.       multivitamin (MULTIVITAMIN) per tablet Take 1 tablet by mouth daily.        omega 3-dha-epa-fish oil (FISH OIL) 60- mg cap capsule Take 1,000 mg by mouth daily.       oxyCODONE-acetaminophen (PERCOCET) 5-325 mg per tablet Take 1-2 tablets by mouth every 6 (six) hours as needed. 60 tablet 0     prochlorperazine (COMPAZINE) 10 MG tablet Take 10 mg by mouth every 8 (eight) hours as needed.       ranitidine (ZANTAC) 150 MG tablet Take 150 mg by mouth 2 (two) times a day as needed for heartburn.       No current facility-administered medications for this encounter.      Facility-Administered Medications Ordered in Other Encounters   Medication Dose Route Frequency Provider Last Rate Last Dose     naloxone 0.4 mg (1 mL) (NARCAN)  0.1 mg Intravenous PRN Grant Ugalde PA-C        Or     nalOXone injection 0.1-0.4 mg (NARCAN)  0.1-0.4 mg Intramuscular PRN Grant Ugalde PA-C           LABS: None    EXAM:  /73  Pulse 86  Temp 98  F (36.7  C) (Oral)   Resp 16  SpO2 97%  General: Stable. In no acute distress.  Neuro: A&O x 3. Moves all extremities equally.  Resp: Lungs clear to auscultation bilaterally.  Cardio: S1S2 and reg, without murmur, clicks or rubs  Abdomen: Lower abdominal drain pulled back from exit site, no leaking noted. To gravity drainage with small amount of serosanguinous fluid.     Pre-Sedation Assessment:  Mallampati Airway Classification: Class 2: upper half of tonsil fossa visible  Previous reaction to anesthesia/sedation: no  Sedation plan based on assessment: Moderate  Sleep Apnea: no  Dentures: no  COPD: no  ASA  Classification: ASA 3 - Patient with moderate systemic disease with functional limitations    ASSESSMENT: 62 year old female with chronic right prosthetic hip infection (H/O multiple hip surgeries) s/p drain placement 11/22/17; drain replacement 12/20/17    PLAN: Abscessogram with possible drain reposition, exchange or removal with sedation as needed    The procedure, risks and moderate sedation were discussed with the patient, all questions answered and patient agrees to proceed with the procedure. Written consent obtained.    Lizbet Harrison, CNP    [facility]: Interventional Radiology   [phone]

## 2021-06-15 NOTE — SEDATION DOCUMENTATION
Pt transferred to bed to wait to go to CT for drain placement. Will continued to be monitored in IR room 2. Pt awake now denies pain.

## 2021-06-15 NOTE — PROGRESS NOTES
Infectious Disease Follow up Note- [facility]:    Service: Infectious Disease   Note: Follow Up Note  Date: 1/8/2018      ASSESSMENT:  Nicole Krishna is a 62 y.o. old female with anemia in the setting of chronically infected right prosthetic hip.     1. 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 Intra-op cultures from re-implantation grew C.albicans. Has been on chronic suppressive therapy with fluconazole since then. Followed by Dr. Muhammad  2.  History of seroma around right hip incision s/p percutaneous drainage by IR. Cultures on 7/1/15 positive with E. Faecalis and CoNS. Was treated with Amoxicillin  3.  History of acetabular component loosening. Patient initially declined revision until recently  4.  Recent right femur periprosthetic fracture with ongoing loosening at the acetabulum on 7/12/17 S/P ORIF periprosthetic right femur fracture, Explantation/removal of all SHA implants, right hip and Radical debridement for chronic prosthetic joint infection on 7/16/17 by Dr. Dean. Cultures with Amp-S E. Faecalis. Discharged on on 7/21/17 on IV ampicillin via picc for 4-6 weeks with follow up with either Dr. Gardner or Dr. Muhammad. Does not appear to have followed as there is no ID clinic follow up note till 11/6/2017.   5.  Admitted on 7/28 for R femur periprosthetic fracture/ hematoma secondary to a new transverse subtrochanteric fracture at proximal cable. New ORIF was not done because of deep infection (hardware will get infected). Discharged on 7/31/17 on Ampicillin.  6. Readmitted 8/3/17 with anemia and had OPEN REDUCTION INTERNAL FIXATION PERIPROSTHETIC RIGHT FEMUR FRACTURE, debridement deep tissue, antibiotic beads placement (Right) on 8/9/17. Pre-op cultures on 8/6/17 with E. Coli, E. Cloacae and P. Aeruginosa, all 3 organisms susceptible to FQ. No intra-op cultures sent. Was treated with 5 days of Levaquin and discharged  again on ampicillin till 8/27 with follow up with Dr. Muhammad or Kendra.  7.  Clinic visit with Dr. Muhammad on 11/6/17. Wound swab cultures with Strep agalactiae and Diphtheroids. Was started on Cipro empirically prior to culture results coming back.   8.  Non-compliance with medical advice. Has been recommended to go to TCU many times and declined before fractures. Was called by Dr. Muhammad for the anemia and did not show up as recommended. Police welfare check on 11/13.  Was supposed to follow-up with Dr. Muhammad before completion of IV ampicillin on 8/27/2017 but never did.  I again discuss the benefit of discharge to a transitional care again on 11/20/2017 and 11/21/2017.  Patient wants nothing to do with TCU because she needs to take care of her dogs.  9.  Now admitted with acute on chronic anemia.  CT of the pelvis with complex fluid collection in the hip compatible with hematoma per report.  This is likely infected.  Superficial swab culture came back positive with MSSA and Streptococcus agalactiae.  Unsure if these organisms are the same in the most likely infected hematoma.  I discussed with Dr. Dean on 11/21/2017.  No plan for surgery at this point.  We agreed to have interventional radiology aspirated fluid for culture and perhaps placing a pigtail drain into the fluid followed by wound care and antibiotic and see how it goes.  I again discuss the benefit of transitional care over home with the patient on 11/22/2017.  And again she wants nothing to do with it.  10. Aspiration of hematoma on 11/22. Cultures growing group B strep, same as previous culture  11. Patient completed IV ceftriaxone on 1/2/18,     Recommendations:  Chronic suppressive abx-- Keflex  Continue chronic suppressive p.o. Fluconazole.  Midline to be removed by home care tomorrow, as not previously removed.   Weekly labs with LFTs, cbc w/ diff, creatinine, crp, and esr    Patient Instructions   Hamilton Rivera,    We will start Keflex by mouth  1000 mg 3 times a day, and monitor. Continue fluconazole. Please consider quitting smoking. As we discussed you will be in touch with Orthopedics regarding follow up. Dr. Abdifatah Salazar also saw you in the hospital.   Follow up with me in 1 month, earlier if needed.     Cecelia Muhammad MD  [facility]  [phone]          ______________________________________________________________________  Notes / labs / vitals reviewed.    SUBJECTIVE / INTERVAL HISTORY: Screen has been removed from right hip, small opening.  Scant drainage.  Incision on the lateral aspect of the right hip has healed.  Patient is not able to ambulate.  Has length discrepancy.  Patient would like to see a physician other than Dr. Potter.  Requesting referral which will be deferred to orthopedics.    Previously:  no events. Underwent aspiration and drain placement on . Serosanguinous drainage. Tolerating abx.     ROS: afebrile, no new hip pain, no rashes.    PMHx/FHx/SHx: Reviewed. Interval changes noted.    OBJECTIVE:  Vitals:    18 1453   BP: 148/80   Pulse: 64   Temp: 97.5  F (36.4  C)       Temp (24hrs), Av.1  F (36.7  C), Min:97.3  F (36.3  C), Max:98.7  F (37.1  C)    GEN: NAD  EYES:  Anicteric, EOM intact.  HEAD, EARS, NOSE, MOUTH, AND THROAT: nc/at MUSCULOSKELETAL: right hip incision site induration and scar, no erythema, no drainage.   IR had placed drain on right hip and inguinal region, this has been removed.  Small opening.  No drainage.  SKIN/HAIR/NAILS: Right hip incision healing  NEUROLOGIC:  Gross neurological exam non-focal.  PSYCHIATRIC:  A&Ox3, appropriate, mentation normal.    Antibiotics:  Ceftriaxone--completed 18.  Keflex 1 g 3 times daily started 8018.  Fluconazole    Pertinent labs:          Invalid input(s):  EOSPCT          Invalid input(s): LABALBU    MICROBIOLOGY DATA:    Cultures reviewed   right hip: group b strep from broth    Wound Culture/Gram Stain (aerobic)    Status:  Final result   Visible to patient:  Yes (MyChart) Next appt:  None Order: [id number]     Culture      3+ Diphtheroids (!)          2+ Streptococcus agalactiae (Group B Streptococcus) (!)          1+ Staphylococcus aureus (!)            Stain      1+ Polymorphonuclear leukocytes          No organisms seen              Resulting Agency: [facility]       Susceptibility       Streptococcus agalactiae (Group B Streptococcus) Staphylococcus aureus       EVA EVA       Cefazolin  <=2  Sensitive       Ceftriaxone <=0.063  Sensitive        Clindamycin >2  Resistant See Comment  Resistant       Doxycycline  <=0.5  Sensitive       Erythromycin >4  Resistant >4  Resistant       Levofloxacin  <=0.5  Sensitive       Oxacillin  0.5  Sensitive       Penicillin <=0.031  Sensitive        Trimethoprim + Sulfamethoxazole  <=1/19  Sensitive       Vancomycin  1  Sensitive                            IMAGING/RADIOLOGY:  Reviewed.

## 2021-06-16 PROBLEM — S72.91XA FEMUR FRACTURE, RIGHT (H): Status: ACTIVE | Noted: 2017-07-13

## 2021-06-16 PROBLEM — I25.10 CORONARY ARTERY DISEASE INVOLVING NATIVE CORONARY ARTERY WITHOUT ANGINA PECTORIS: Status: ACTIVE | Noted: 2018-06-11

## 2021-06-16 PROBLEM — A41.9 SEPSIS (H): Status: ACTIVE | Noted: 2017-07-28

## 2021-06-16 PROBLEM — S70.01XD: Status: ACTIVE | Noted: 2017-08-03

## 2021-06-16 PROBLEM — R06.00 DYSPNEA: Status: ACTIVE | Noted: 2019-04-02

## 2021-06-16 PROBLEM — J96.01 ACUTE RESPIRATORY FAILURE WITH HYPOXIA (H): Status: ACTIVE | Noted: 2020-02-26

## 2021-06-16 PROBLEM — I50.23 ACUTE ON CHRONIC SYSTOLIC HEART FAILURE (H): Status: ACTIVE | Noted: 2019-04-02

## 2021-06-16 PROBLEM — I82.4Z1 ACUTE DEEP VEIN THROMBOSIS (DVT) OF DISTAL VEIN OF RIGHT LOWER EXTREMITY (H): Status: ACTIVE | Noted: 2017-08-04

## 2021-06-16 PROBLEM — E46 MALNUTRITION (H): Status: ACTIVE | Noted: 2017-08-04

## 2021-06-16 PROBLEM — N17.9 ACUTE KIDNEY FAILURE, UNSPECIFIED (H): Status: ACTIVE | Noted: 2020-12-01

## 2021-06-16 PROBLEM — J44.1 COPD EXACERBATION (H): Status: ACTIVE | Noted: 2019-04-02

## 2021-06-16 PROBLEM — I47.29 NONSUSTAINED VENTRICULAR TACHYCARDIA (H): Status: ACTIVE | Noted: 2018-06-11

## 2021-06-16 PROBLEM — E87.20 LACTIC ACID ACIDOSIS: Status: ACTIVE | Noted: 2017-07-28

## 2021-06-16 PROBLEM — S72.001G: Status: ACTIVE | Noted: 2017-07-13

## 2021-06-16 PROBLEM — R09.02 HYPOXIA: Status: ACTIVE | Noted: 2020-11-28

## 2021-06-16 PROBLEM — I50.9 HEART FAILURE EXACERBATED BY SOTALOL (H): Status: ACTIVE | Noted: 2019-11-12

## 2021-06-16 PROBLEM — Z91.199 MEDICALLY NONCOMPLIANT: Status: ACTIVE | Noted: 2020-08-16

## 2021-06-16 PROBLEM — D62 ACUTE BLOOD LOSS ANEMIA: Status: ACTIVE | Noted: 2017-11-20

## 2021-06-19 NOTE — PROGRESS NOTES
To Close This Visit        Required Items     No level of service for this encounter     No diagnosis for this encounter     No additional encounter notes found.              Initial consult  7/10/2018       Amrik Crystal MD - Pending sale to [facility]  Encounter Summary       Diagnoses        None              Orders Signed This Encounter        None              Orders Pended This Encounter        None                Referring Provider        Hazel Gutierrez MD         Progress notes        No notes of this type exist for this encounter.         Patient Instructions        Nicole MINA Tomi     Thank you for coming to the [facility] today for a cardiac evaluation  It was my pleasure to see you today  A good contact for any questions would be Yu Moody  RN @ [phone]                 Routing History        There are no sent or routed communications associated with this encounter.        Patient was a no-show  Unfortunately the chart was opened  See if you  can eliminate this note

## 2021-07-13 ENCOUNTER — RECORDS - HEALTHEAST (OUTPATIENT)
Dept: ADMINISTRATIVE | Facility: CLINIC | Age: 66
End: 2021-07-13

## 2021-07-14 PROBLEM — I50.21 ACUTE SYSTOLIC CONGESTIVE HEART FAILURE (H): Status: RESOLVED | Noted: 2018-06-06 | Resolved: 2019-11-12

## 2021-07-14 PROBLEM — I50.9 ACUTE CHF (H): Status: RESOLVED | Noted: 2018-06-06 | Resolved: 2019-11-12

## 2021-07-21 ENCOUNTER — RECORDS - HEALTHEAST (OUTPATIENT)
Dept: ADMINISTRATIVE | Facility: CLINIC | Age: 66
End: 2021-07-21

## 2021-08-29 ENCOUNTER — HEALTH MAINTENANCE LETTER (OUTPATIENT)
Age: 66
End: 2021-08-29

## 2021-10-24 ENCOUNTER — HEALTH MAINTENANCE LETTER (OUTPATIENT)
Age: 66
End: 2021-10-24

## 2022-04-10 ENCOUNTER — HEALTH MAINTENANCE LETTER (OUTPATIENT)
Age: 67
End: 2022-04-10

## 2022-06-05 ENCOUNTER — HEALTH MAINTENANCE LETTER (OUTPATIENT)
Age: 67
End: 2022-06-05

## 2022-07-31 ENCOUNTER — HEALTH MAINTENANCE LETTER (OUTPATIENT)
Age: 67
End: 2022-07-31

## 2022-10-15 ENCOUNTER — HEALTH MAINTENANCE LETTER (OUTPATIENT)
Age: 67
End: 2022-10-15

## 2023-06-01 ENCOUNTER — HEALTH MAINTENANCE LETTER (OUTPATIENT)
Age: 68
End: 2023-06-01

## 2023-08-20 ENCOUNTER — HEALTH MAINTENANCE LETTER (OUTPATIENT)
Age: 68
End: 2023-08-20

## 2024-01-07 ENCOUNTER — HEALTH MAINTENANCE LETTER (OUTPATIENT)
Age: 69
End: 2024-01-07